# Patient Record
Sex: FEMALE | NOT HISPANIC OR LATINO | URBAN - METROPOLITAN AREA
[De-identification: names, ages, dates, MRNs, and addresses within clinical notes are randomized per-mention and may not be internally consistent; named-entity substitution may affect disease eponyms.]

---

## 2018-02-13 ENCOUNTER — INPATIENT (INPATIENT)
Facility: HOSPITAL | Age: 78
LOS: 5 days | Discharge: HOME HEALTH | End: 2018-02-19
Attending: SURGERY | Admitting: SURGERY
Payer: MEDICARE

## 2018-02-13 VITALS
TEMPERATURE: 98 F | OXYGEN SATURATION: 96 % | SYSTOLIC BLOOD PRESSURE: 139 MMHG | RESPIRATION RATE: 18 BRPM | HEART RATE: 118 BPM | DIASTOLIC BLOOD PRESSURE: 92 MMHG

## 2018-02-13 DIAGNOSIS — I10 ESSENTIAL (PRIMARY) HYPERTENSION: ICD-10-CM

## 2018-02-13 DIAGNOSIS — F03.90 UNSPECIFIED DEMENTIA WITHOUT BEHAVIORAL DISTURBANCE: ICD-10-CM

## 2018-02-13 DIAGNOSIS — E78.5 HYPERLIPIDEMIA, UNSPECIFIED: ICD-10-CM

## 2018-02-13 DIAGNOSIS — K56.609 UNSPECIFIED INTESTINAL OBSTRUCTION, UNSPECIFIED AS TO PARTIAL VERSUS COMPLETE OBSTRUCTION: ICD-10-CM

## 2018-02-13 LAB
ALBUMIN SERPL ELPH-MCNC: 3.3 G/DL — SIGNIFICANT CHANGE UP (ref 3.3–5)
ALP SERPL-CCNC: 110 U/L — SIGNIFICANT CHANGE UP (ref 40–120)
ALT FLD-CCNC: 18 U/L — SIGNIFICANT CHANGE UP (ref 12–78)
ANION GAP SERPL CALC-SCNC: 8 MMOL/L — SIGNIFICANT CHANGE UP (ref 5–17)
APPEARANCE UR: CLEAR — SIGNIFICANT CHANGE UP
APTT BLD: 25.3 SEC — LOW (ref 27.5–37.4)
AST SERPL-CCNC: 23 U/L — SIGNIFICANT CHANGE UP (ref 15–37)
BACTERIA # UR AUTO: ABNORMAL
BASOPHILS # BLD AUTO: 0.01 K/UL — SIGNIFICANT CHANGE UP (ref 0–0.2)
BASOPHILS NFR BLD AUTO: 0.1 % — SIGNIFICANT CHANGE UP (ref 0–2)
BILIRUB SERPL-MCNC: 0.9 MG/DL — SIGNIFICANT CHANGE UP (ref 0.2–1.2)
BILIRUB UR-MCNC: NEGATIVE — SIGNIFICANT CHANGE UP
BLD GP AB SCN SERPL QL: SIGNIFICANT CHANGE UP
BUN SERPL-MCNC: 15 MG/DL — SIGNIFICANT CHANGE UP (ref 7–23)
CALCIUM SERPL-MCNC: 9.1 MG/DL — SIGNIFICANT CHANGE UP (ref 8.5–10.1)
CHLORIDE SERPL-SCNC: 103 MMOL/L — SIGNIFICANT CHANGE UP (ref 96–108)
CK MB CFR SERPL CALC: 1.3 NG/ML — SIGNIFICANT CHANGE UP (ref 0.5–3.6)
CO2 SERPL-SCNC: 30 MMOL/L — SIGNIFICANT CHANGE UP (ref 22–31)
COLOR SPEC: YELLOW — SIGNIFICANT CHANGE UP
CREAT SERPL-MCNC: 1.06 MG/DL — SIGNIFICANT CHANGE UP (ref 0.5–1.3)
DIFF PNL FLD: ABNORMAL
EOSINOPHIL # BLD AUTO: 0 K/UL — SIGNIFICANT CHANGE UP (ref 0–0.5)
EOSINOPHIL NFR BLD AUTO: 0 % — SIGNIFICANT CHANGE UP (ref 0–6)
EPI CELLS # UR: ABNORMAL
GLUCOSE SERPL-MCNC: 153 MG/DL — HIGH (ref 70–99)
GLUCOSE UR QL: NEGATIVE MG/DL — SIGNIFICANT CHANGE UP
HCT VFR BLD CALC: 47.1 % — HIGH (ref 34.5–45)
HGB BLD-MCNC: 16.1 G/DL — HIGH (ref 11.5–15.5)
IMM GRANULOCYTES NFR BLD AUTO: 0.3 % — SIGNIFICANT CHANGE UP (ref 0–1.5)
INR BLD: 1.06 RATIO — SIGNIFICANT CHANGE UP (ref 0.88–1.16)
KETONES UR-MCNC: NEGATIVE — SIGNIFICANT CHANGE UP
LEUKOCYTE ESTERASE UR-ACNC: ABNORMAL
LIDOCAIN IGE QN: 167 U/L — SIGNIFICANT CHANGE UP (ref 73–393)
LYMPHOCYTES # BLD AUTO: 0.55 K/UL — LOW (ref 1–3.3)
LYMPHOCYTES # BLD AUTO: 7.2 % — LOW (ref 13–44)
MAGNESIUM SERPL-MCNC: 2.2 MG/DL — SIGNIFICANT CHANGE UP (ref 1.6–2.6)
MCHC RBC-ENTMCNC: 32.8 PG — SIGNIFICANT CHANGE UP (ref 27–34)
MCHC RBC-ENTMCNC: 34.2 GM/DL — SIGNIFICANT CHANGE UP (ref 32–36)
MCV RBC AUTO: 95.9 FL — SIGNIFICANT CHANGE UP (ref 80–100)
MONOCYTES # BLD AUTO: 0.42 K/UL — SIGNIFICANT CHANGE UP (ref 0–0.9)
MONOCYTES NFR BLD AUTO: 5.5 % — SIGNIFICANT CHANGE UP (ref 2–14)
NEUTROPHILS # BLD AUTO: 6.68 K/UL — SIGNIFICANT CHANGE UP (ref 1.8–7.4)
NEUTROPHILS NFR BLD AUTO: 86.9 % — HIGH (ref 43–77)
NITRITE UR-MCNC: NEGATIVE — SIGNIFICANT CHANGE UP
NRBC # BLD: 0 /100 WBCS — SIGNIFICANT CHANGE UP (ref 0–0)
PH UR: 5 — SIGNIFICANT CHANGE UP (ref 5–8)
PLATELET # BLD AUTO: 210 K/UL — SIGNIFICANT CHANGE UP (ref 150–400)
POTASSIUM SERPL-MCNC: 4 MMOL/L — SIGNIFICANT CHANGE UP (ref 3.5–5.3)
POTASSIUM SERPL-SCNC: 4 MMOL/L — SIGNIFICANT CHANGE UP (ref 3.5–5.3)
PROT SERPL-MCNC: 8.3 GM/DL — SIGNIFICANT CHANGE UP (ref 6–8.3)
PROT UR-MCNC: 30 MG/DL
PROTHROM AB SERPL-ACNC: 11.6 SEC — SIGNIFICANT CHANGE UP (ref 9.8–12.7)
RBC # BLD: 4.91 M/UL — SIGNIFICANT CHANGE UP (ref 3.8–5.2)
RBC # FLD: 13 % — SIGNIFICANT CHANGE UP (ref 10.3–14.5)
RBC CASTS # UR COMP ASSIST: SIGNIFICANT CHANGE UP /HPF (ref 0–4)
SODIUM SERPL-SCNC: 141 MMOL/L — SIGNIFICANT CHANGE UP (ref 135–145)
SP GR SPEC: 1.02 — SIGNIFICANT CHANGE UP (ref 1.01–1.02)
TROPONIN I SERPL-MCNC: <.015 NG/ML — SIGNIFICANT CHANGE UP (ref 0.01–0.04)
UROBILINOGEN FLD QL: NEGATIVE MG/DL — SIGNIFICANT CHANGE UP
WBC # BLD: 7.68 K/UL — SIGNIFICANT CHANGE UP (ref 3.8–10.5)
WBC # FLD AUTO: 7.68 K/UL — SIGNIFICANT CHANGE UP (ref 3.8–10.5)
WBC UR QL: ABNORMAL

## 2018-02-13 PROCEDURE — 99223 1ST HOSP IP/OBS HIGH 75: CPT | Mod: 25,57

## 2018-02-13 PROCEDURE — 93010 ELECTROCARDIOGRAM REPORT: CPT

## 2018-02-13 PROCEDURE — 99291 CRITICAL CARE FIRST HOUR: CPT

## 2018-02-13 PROCEDURE — 99285 EMERGENCY DEPT VISIT HI MDM: CPT

## 2018-02-13 PROCEDURE — 44005 FREEING OF BOWEL ADHESION: CPT

## 2018-02-13 PROCEDURE — 74177 CT ABD & PELVIS W/CONTRAST: CPT | Mod: 26

## 2018-02-13 RX ORDER — CEFTRIAXONE 500 MG/1
1 INJECTION, POWDER, FOR SOLUTION INTRAMUSCULAR; INTRAVENOUS ONCE
Qty: 0 | Refills: 0 | Status: COMPLETED | OUTPATIENT
Start: 2018-02-13 | End: 2018-02-13

## 2018-02-13 RX ORDER — AMLODIPINE BESYLATE 2.5 MG/1
2.5 TABLET ORAL DAILY
Qty: 0 | Refills: 0 | Status: DISCONTINUED | OUTPATIENT
Start: 2018-02-13 | End: 2018-02-13

## 2018-02-13 RX ORDER — SODIUM CHLORIDE 9 MG/ML
1000 INJECTION, SOLUTION INTRAVENOUS
Qty: 0 | Refills: 0 | Status: DISCONTINUED | OUTPATIENT
Start: 2018-02-13 | End: 2018-02-14

## 2018-02-13 RX ORDER — SIMVASTATIN 20 MG/1
0 TABLET, FILM COATED ORAL
Qty: 0 | Refills: 0 | COMMUNITY

## 2018-02-13 RX ORDER — ACETAMINOPHEN 500 MG
650 TABLET ORAL EVERY 6 HOURS
Qty: 0 | Refills: 0 | Status: DISCONTINUED | OUTPATIENT
Start: 2018-02-13 | End: 2018-02-13

## 2018-02-13 RX ORDER — AMLODIPINE BESYLATE 2.5 MG/1
0 TABLET ORAL
Qty: 0 | Refills: 0 | COMMUNITY

## 2018-02-13 RX ORDER — ONDANSETRON 8 MG/1
4 TABLET, FILM COATED ORAL EVERY 6 HOURS
Qty: 0 | Refills: 0 | Status: DISCONTINUED | OUTPATIENT
Start: 2018-02-13 | End: 2018-02-15

## 2018-02-13 RX ORDER — MORPHINE SULFATE 50 MG/1
2 CAPSULE, EXTENDED RELEASE ORAL EVERY 6 HOURS
Qty: 0 | Refills: 0 | Status: DISCONTINUED | OUTPATIENT
Start: 2018-02-13 | End: 2018-02-14

## 2018-02-13 RX ORDER — MORPHINE SULFATE 50 MG/1
2 CAPSULE, EXTENDED RELEASE ORAL
Qty: 0 | Refills: 0 | Status: DISCONTINUED | OUTPATIENT
Start: 2018-02-13 | End: 2018-02-14

## 2018-02-13 RX ORDER — ONDANSETRON 8 MG/1
4 TABLET, FILM COATED ORAL ONCE
Qty: 0 | Refills: 0 | Status: DISCONTINUED | OUTPATIENT
Start: 2018-02-13 | End: 2018-02-14

## 2018-02-13 RX ORDER — SODIUM CHLORIDE 9 MG/ML
1000 INJECTION INTRAMUSCULAR; INTRAVENOUS; SUBCUTANEOUS
Qty: 0 | Refills: 0 | Status: DISCONTINUED | OUTPATIENT
Start: 2018-02-13 | End: 2018-02-13

## 2018-02-13 RX ORDER — HEPARIN SODIUM 5000 [USP'U]/ML
5000 INJECTION INTRAVENOUS; SUBCUTANEOUS EVERY 12 HOURS
Qty: 0 | Refills: 0 | Status: DISCONTINUED | OUTPATIENT
Start: 2018-02-13 | End: 2018-02-19

## 2018-02-13 RX ORDER — ONDANSETRON 8 MG/1
4 TABLET, FILM COATED ORAL EVERY 6 HOURS
Qty: 0 | Refills: 0 | Status: DISCONTINUED | OUTPATIENT
Start: 2018-02-13 | End: 2018-02-13

## 2018-02-13 RX ORDER — CEFOTETAN DISODIUM 1 G
2 VIAL (EA) INJECTION EVERY 12 HOURS
Qty: 0 | Refills: 0 | Status: DISCONTINUED | OUTPATIENT
Start: 2018-02-13 | End: 2018-02-14

## 2018-02-13 RX ADMIN — CEFTRIAXONE 100 GRAM(S): 500 INJECTION, POWDER, FOR SOLUTION INTRAMUSCULAR; INTRAVENOUS at 17:59

## 2018-02-13 NOTE — H&P ADULT - ATTENDING COMMENTS
Patient seen and examined.  CT scan report and images reviewed and consistent with closed loop SBO.    OR for emergent exploratory laparotomy, relief of closed loop small bowel resection, possible bowel resection, possible ostomy.    I explained to the patient's daughter, who her is health care proxy, that her mother will require an emergent operative exploration given the CT findings of a closed loop small bowel obstruction.  The risks of surgery, including but not limited to, bleeding, infection, damage to surrounding structures (including enterotomy), need for bowel resection and/or ostomy, and possibility of recurrent obstructions, were discussed with the patient's daughter who understands these risks and consents to the planned surgery.  In addition, the risks associated with small bowel resection, including anastomotic leak, were discussed.  All questions were answered. Patient seen and examined.  CT scan report and images reviewed and consistent with a closed loop SBO.    OR for emergent exploratory laparotomy, relief of closed loop small bowel resection, possible bowel resection, possible ostomy.    I explained to the patient's daughter, who her is health care proxy, that her mother will require an emergent operative exploration given the CT findings of a closed loop small bowel obstruction.  The risks of surgery, including but not limited to, bleeding, infection, damage to surrounding structures (including enterotomy), need for bowel resection and/or ostomy, and possibility of recurrent obstructions, were discussed with the patient's daughter who understands these risks and consents to the planned surgery.  In addition, the risks associated with small bowel resection, including anastomotic leak, were discussed.  All questions were answered.

## 2018-02-13 NOTE — H&P ADULT - HISTORY OF PRESENT ILLNESS
78 y/o female PMHx HTN, HLD, dementia 78 y/o female PMHx HTN, HLD, dementia c/p abdominal pain. Patient's daughter states that the aid noticed the patient holding onto her abdominal in pain today. No nausea/vomiting. Patient's last BM is unknown. +Flatus in the ED per daughter. No other complaints. Denies fever, chills, nausea, vomiting, constipation, diarrhea, hematuria, melena, hematochezia, chest pain, shortness of breath, dizziness. Patient denies prior incident. Denies surgical hx. 78 y/o female PMHx HTN, HLD, dementia c/o abdominal pain. Patient's daughter states that the aid noticed the patient holding onto her abdominal in pain today. No nausea/vomiting. Patient's last BM is unknown. +Flatus in the ED per daughter. No other complaints. Denies fever, chills, nausea, vomiting, constipation, diarrhea, hematuria, melena, hematochezia, chest pain, shortness of breath, dizziness. Patient denies prior incident. Denies surgical hx.

## 2018-02-13 NOTE — ED PROVIDER NOTE - PHYSICAL EXAMINATION
Gen: Alert, NAD  Head: NC, AT   Eyes: PERRL, EOMI, normal lids/conjunctiva  ENT: normal hearing, patent oropharynx without erythema/exudate, uvula midline  Neck: supple, no tenderness, Trachea midline  Pulm: Bilateral BS, normal resp effort, no wheeze/stridor/retractions  CV: RRR, no M/R/G, 2+ radial and dp pulses bl, no edema  Abd: soft, ttp on the left abd. +BS, no hepatosplenomegaly  Mskel: extremities x4 with normal ROM and no joint effusions. no ctl spine ttp.   Skin: no rash, no bruising   Neuro: AAOx1, no sensory/motor deficits, CN 2-12 intact

## 2018-02-13 NOTE — BRIEF OPERATIVE NOTE - PROCEDURE
<<-----Click on this checkbox to enter Procedure Exploratory laparotomy  02/13/2018    Active  NMARINO1  Lysis of adhesions  02/13/2018  to release closed loop obstruction  Active  NMARINO1

## 2018-02-13 NOTE — H&P ADULT - PROBLEM SELECTOR PLAN 1
Admit patient  OR for ex lap, possible bowel resection, possible ostomy  NGT, NPO, IVF  pain meds, anti-emetic prn  dvt ppx  Consent signed by daughter  Discussed with Dr Rascon. Seen and examined with Abiodun

## 2018-02-13 NOTE — H&P ADULT - NSHPLABSRESULTS_GEN_ALL_CORE
16.1   7.68  )-----------( 210      ( 13 Feb 2018 15:21 )             47.1   02-13    141  |  103  |  15  ----------------------------<  153<H>  4.0   |  30  |  1.06    Ca    9.1      13 Feb 2018 15:21  Mg     2.2     02-13    TPro  8.3  /  Alb  3.3  /  TBili  0.9  /  DBili  x   /  AST  23  /  ALT  18  /  AlkPhos  110  02-13      CT Abdomen and Pelvis w/ IV Cont (02.13.18 @ 18:11)   IMPRESSION:   Small bowel obstruction with mid lower abdominal transition point. Areas   of small bowel wall thickening and mesenteric edema are noted.

## 2018-02-13 NOTE — ED PROVIDER NOTE - OBJECTIVE STATEMENT
Pertinent PMH/PSH/FHx/SHx and Review of Systems contained within:  77F hx of dementia htn, no prev surgeries pw abd pain. patient was complaining of abd pain while at home with aide. no reprot was given of nausea, vomiting, fever or chills. patients child was called and meets patient in the ER. patient complains of NO abd pain, nausea, vomiting, dysuria, cp or sob now. however, she is demented  Fh and Sh not otherwise contributory  ROS otherwise negative

## 2018-02-13 NOTE — ED ADULT TRIAGE NOTE - CHIEF COMPLAINT QUOTE
BIBEMS from home.  c/o abdominal pain, denies N/V/D.  Denies urinary complaints.   Pt received AAOx2 (confused to year)  Hx:  dementia

## 2018-02-13 NOTE — ED ADULT NURSE NOTE - OBJECTIVE STATEMENT
patient received, BIBA with daughter at bedside, daughter stated aide called ambulance because patient was complaining of abd pain. denies abd pain, denies n/v/d, denies dizziness. no distress noted

## 2018-02-13 NOTE — CONSULT NOTE ADULT - ATTENDING COMMENTS
s/p Exploratory laparotomy, Lysis of adhesions to release closed loop obstruction (SBO). Pt is s/p successful extubation and hemodynamically stable. Currently does not require ICU monitoring

## 2018-02-13 NOTE — ED PROVIDER NOTE - MEDICAL DECISION MAKING DETAILS
Patient pw abd pain. given dementia, will need to work up. CT shows sbo. Ua shows uti. Labs wnl. Will keep npo and admit to gen surg. Dr. silver aware.

## 2018-02-13 NOTE — CONSULT NOTE ADULT - SUBJECTIVE AND OBJECTIVE BOX
Patient is a 76 y/o female PMHx HTN, HLD, dementia  presented today to ED for abdominal pain. CT abdomen showed: < from: CT Abdomen and Pelvis w/ IV Cont (18 @ 18:11) >  there are at least 2 transitions noted in the mid lower abdomen, notably on image 35:601, concerning for possible closed loop obstruction.   Small bowel obstruction with mid lower abdominal transition point. Areas   of small bowel wall thickening and mesenteric edema are noted.  Patient went to OR underwent Ex Lap for SBO, patient extubated in OR. As per anesthesia patient BP dropped and received 2L of boluses and had minimal blood loss.  ICU consulted, patient seen and evaluated with icu fellow.         PAST MEDICAL & SURGICAL HISTORY:  Dementia  Hyperlipidemia  HTN (hypertension)  No significant past surgical history    FAMILY HISTORY:  No pertinent family history in first degree relatives    Social History: Allergies    No Known Allergies    Intolerances        MEDICATIONS  (STANDING):  cefoTEtan  IVPB 2 Gram(s) IV Intermittent every 12 hours  enalaprilat Injectable 1.25 milliGRAM(s) IV Push every 6 hours  heparin  Injectable 5000 Unit(s) SubCutaneous every 12 hours  lactated ringers. 1000 milliLiter(s) (100 mL/Hr) IV Continuous <Continuous>    MEDICATIONS  (PRN):  morphine  - Injectable 2 milliGRAM(s) IV Push every 10 minutes PRN Severe Pain  morphine  - Injectable 2 milliGRAM(s) IV Push every 6 hours PRN Severe Pain (7 - 10)  ondansetron Injectable 4 milliGRAM(s) IV Push once PRN Nausea and/or Vomiting  ondansetron Injectable 4 milliGRAM(s) IV Push every 6 hours PRN Nausea and/or Vomiting      REVIEW OF SYSTEMS:  patient post-op opens eyes to voice,       PHYSICAL EXAM:    T(C): 36.6 (2018 22:25), Max: 37.2 (2018 20:14)  T(F): 97.8 (2018 22:25), Max: 99 (2018 20:14)  HR: 73 (2018 23:10) (73 - 118)  BP: 161/89 (2018 23:10) (139/92 - 185/99)  RR: 19 (2018 23:10) (18 - 23)  SpO2: 100% (2018 23:10) (96% - 100%)    GENERAL: sleepy, post-op from anesthesia  NERVOUS SYSTEM:  sleepy, but opens eyes to voice  CHEST/LUNG: b/l rhonchi  HEART: Regular rate and rhythm; No murmurs, rubs, or gallops  ABDOMEN: midline incision dressing clean and dry  EXTREMITIES:  2+ Peripheral Pulses, No clubbing, cyanosis, or edema          LABS:                        16.1   7.68  )-----------( 210      ( 2018 15:21 )             47.1         141  |  103  |  15  ----------------------------<  153<H>  4.0   |  30  |  1.06    Ca    9.1      2018 15:21  Mg     2.2         TPro  8.3  /  Alb  3.3  /  TBili  0.9  /  DBili  x   /  AST  23  /  ALT  18  /  AlkPhos  110  13    PT/INR - ( 2018 15:21 )   PT: 11.6 sec;   INR: 1.06 ratio         PTT - ( 2018 15:21 )  PTT:25.3 sec  Urinalysis Basic - ( 2018 17:18 )    Color: Yellow / Appearance: Clear / S.020 / pH: x  Gluc: x / Ketone: Negative  / Bili: Negative / Urobili: Negative mg/dL   Blood: x / Protein: 30 mg/dL / Nitrite: Negative   Leuk Esterase: Trace / RBC: 0-2 /HPF / WBC 6-10   Sq Epi: x / Non Sq Epi: Many / Bacteria: Moderate        RADIOLOGY & ADDITIONAL STUDIES:  < from: CT Abdomen and Pelvis w/ IV Cont (18 @ 18:11) >  Small bowel obstruction with mid lower abdominal transition point. Areas   of small bowel wall thickening and mesenteric edema are noted.  here are at least 2 transitions noted in the mid   lower abdomen, notably on image 35:601, concerning for possible closed   loop obstruction.      A/P    Patient is a 76 y/o female PMHx HTN, HLD, dementia , s/p Ex Lap with SEPIDEH for SBO  Patient seen and evaluated with ICU fellow  Patient is sleepy but arousable to voice, due to anestheisa.  Patient is saturating well on room air, hemodynamic stable SBP was 160 with good  urine output  would follow up urine output >5ml/kg/hr  and repeat CBC post-op  At this point patient does not need icu care, if patient's BP drops or condition changes please re-consult.    Case d/w with EICU attending.

## 2018-02-14 ENCOUNTER — TRANSCRIPTION ENCOUNTER (OUTPATIENT)
Age: 78
End: 2018-02-14

## 2018-02-14 LAB
ALBUMIN SERPL ELPH-MCNC: 2.7 G/DL — LOW (ref 3.3–5)
ALBUMIN SERPL ELPH-MCNC: 2.9 G/DL — LOW (ref 3.3–5)
ALP SERPL-CCNC: 79 U/L — SIGNIFICANT CHANGE UP (ref 40–120)
ALP SERPL-CCNC: 85 U/L — SIGNIFICANT CHANGE UP (ref 40–120)
ALT FLD-CCNC: 14 U/L — SIGNIFICANT CHANGE UP (ref 12–78)
ALT FLD-CCNC: 17 U/L — SIGNIFICANT CHANGE UP (ref 12–78)
ANION GAP SERPL CALC-SCNC: 8 MMOL/L — SIGNIFICANT CHANGE UP (ref 5–17)
ANION GAP SERPL CALC-SCNC: 8 MMOL/L — SIGNIFICANT CHANGE UP (ref 5–17)
AST SERPL-CCNC: 20 U/L — SIGNIFICANT CHANGE UP (ref 15–37)
AST SERPL-CCNC: 25 U/L — SIGNIFICANT CHANGE UP (ref 15–37)
BASOPHILS # BLD AUTO: 0.03 K/UL — SIGNIFICANT CHANGE UP (ref 0–0.2)
BASOPHILS # BLD AUTO: 0.03 K/UL — SIGNIFICANT CHANGE UP (ref 0–0.2)
BASOPHILS NFR BLD AUTO: 0.3 % — SIGNIFICANT CHANGE UP (ref 0–2)
BASOPHILS NFR BLD AUTO: 0.3 % — SIGNIFICANT CHANGE UP (ref 0–2)
BILIRUB SERPL-MCNC: 0.8 MG/DL — SIGNIFICANT CHANGE UP (ref 0.2–1.2)
BILIRUB SERPL-MCNC: 1.2 MG/DL — SIGNIFICANT CHANGE UP (ref 0.2–1.2)
BUN SERPL-MCNC: 13 MG/DL — SIGNIFICANT CHANGE UP (ref 7–23)
BUN SERPL-MCNC: 13 MG/DL — SIGNIFICANT CHANGE UP (ref 7–23)
CALCIUM SERPL-MCNC: 8.2 MG/DL — LOW (ref 8.5–10.1)
CALCIUM SERPL-MCNC: 8.6 MG/DL — SIGNIFICANT CHANGE UP (ref 8.5–10.1)
CHLORIDE SERPL-SCNC: 106 MMOL/L — SIGNIFICANT CHANGE UP (ref 96–108)
CHLORIDE SERPL-SCNC: 109 MMOL/L — HIGH (ref 96–108)
CO2 SERPL-SCNC: 27 MMOL/L — SIGNIFICANT CHANGE UP (ref 22–31)
CO2 SERPL-SCNC: 28 MMOL/L — SIGNIFICANT CHANGE UP (ref 22–31)
CREAT SERPL-MCNC: 1.07 MG/DL — SIGNIFICANT CHANGE UP (ref 0.5–1.3)
CREAT SERPL-MCNC: 1.09 MG/DL — SIGNIFICANT CHANGE UP (ref 0.5–1.3)
CULTURE RESULTS: SIGNIFICANT CHANGE UP
EOSINOPHIL # BLD AUTO: 0 K/UL — SIGNIFICANT CHANGE UP (ref 0–0.5)
EOSINOPHIL # BLD AUTO: 0 K/UL — SIGNIFICANT CHANGE UP (ref 0–0.5)
EOSINOPHIL NFR BLD AUTO: 0 % — SIGNIFICANT CHANGE UP (ref 0–6)
EOSINOPHIL NFR BLD AUTO: 0 % — SIGNIFICANT CHANGE UP (ref 0–6)
GLUCOSE SERPL-MCNC: 130 MG/DL — HIGH (ref 70–99)
GLUCOSE SERPL-MCNC: 131 MG/DL — HIGH (ref 70–99)
HCT VFR BLD CALC: 42.7 % — SIGNIFICANT CHANGE UP (ref 34.5–45)
HCT VFR BLD CALC: 45.3 % — HIGH (ref 34.5–45)
HGB BLD-MCNC: 14.3 G/DL — SIGNIFICANT CHANGE UP (ref 11.5–15.5)
HGB BLD-MCNC: 15.1 G/DL — SIGNIFICANT CHANGE UP (ref 11.5–15.5)
IMM GRANULOCYTES NFR BLD AUTO: 0.2 % — SIGNIFICANT CHANGE UP (ref 0–1.5)
IMM GRANULOCYTES NFR BLD AUTO: 0.2 % — SIGNIFICANT CHANGE UP (ref 0–1.5)
LACTATE SERPL-SCNC: 2 MMOL/L — SIGNIFICANT CHANGE UP (ref 0.7–2)
LACTATE SERPL-SCNC: 2.7 MMOL/L — HIGH (ref 0.7–2)
LACTATE SERPL-SCNC: 3.8 MMOL/L — HIGH (ref 0.7–2)
LYMPHOCYTES # BLD AUTO: 0.63 K/UL — LOW (ref 1–3.3)
LYMPHOCYTES # BLD AUTO: 0.86 K/UL — LOW (ref 1–3.3)
LYMPHOCYTES # BLD AUTO: 6.4 % — LOW (ref 13–44)
LYMPHOCYTES # BLD AUTO: 8.5 % — LOW (ref 13–44)
MAGNESIUM SERPL-MCNC: 2 MG/DL — SIGNIFICANT CHANGE UP (ref 1.6–2.6)
MCHC RBC-ENTMCNC: 32.6 PG — SIGNIFICANT CHANGE UP (ref 27–34)
MCHC RBC-ENTMCNC: 33.1 PG — SIGNIFICANT CHANGE UP (ref 27–34)
MCHC RBC-ENTMCNC: 33.3 GM/DL — SIGNIFICANT CHANGE UP (ref 32–36)
MCHC RBC-ENTMCNC: 33.5 GM/DL — SIGNIFICANT CHANGE UP (ref 32–36)
MCV RBC AUTO: 97.8 FL — SIGNIFICANT CHANGE UP (ref 80–100)
MCV RBC AUTO: 98.8 FL — SIGNIFICANT CHANGE UP (ref 80–100)
MONOCYTES # BLD AUTO: 0.68 K/UL — SIGNIFICANT CHANGE UP (ref 0–0.9)
MONOCYTES # BLD AUTO: 0.83 K/UL — SIGNIFICANT CHANGE UP (ref 0–0.9)
MONOCYTES NFR BLD AUTO: 6.7 % — SIGNIFICANT CHANGE UP (ref 2–14)
MONOCYTES NFR BLD AUTO: 8.5 % — SIGNIFICANT CHANGE UP (ref 2–14)
NEUTROPHILS # BLD AUTO: 8.26 K/UL — HIGH (ref 1.8–7.4)
NEUTROPHILS # BLD AUTO: 8.49 K/UL — HIGH (ref 1.8–7.4)
NEUTROPHILS NFR BLD AUTO: 84.3 % — HIGH (ref 43–77)
NEUTROPHILS NFR BLD AUTO: 84.6 % — HIGH (ref 43–77)
NRBC # BLD: 0 /100 WBCS — SIGNIFICANT CHANGE UP (ref 0–0)
PHOSPHATE SERPL-MCNC: 3.8 MG/DL — SIGNIFICANT CHANGE UP (ref 2.5–4.5)
PLATELET # BLD AUTO: 171 K/UL — SIGNIFICANT CHANGE UP (ref 150–400)
PLATELET # BLD AUTO: 177 K/UL — SIGNIFICANT CHANGE UP (ref 150–400)
POTASSIUM SERPL-MCNC: 3.7 MMOL/L — SIGNIFICANT CHANGE UP (ref 3.5–5.3)
POTASSIUM SERPL-MCNC: 3.7 MMOL/L — SIGNIFICANT CHANGE UP (ref 3.5–5.3)
POTASSIUM SERPL-SCNC: 3.7 MMOL/L — SIGNIFICANT CHANGE UP (ref 3.5–5.3)
POTASSIUM SERPL-SCNC: 3.7 MMOL/L — SIGNIFICANT CHANGE UP (ref 3.5–5.3)
PROT SERPL-MCNC: 6.9 GM/DL — SIGNIFICANT CHANGE UP (ref 6–8.3)
PROT SERPL-MCNC: 7.1 GM/DL — SIGNIFICANT CHANGE UP (ref 6–8.3)
RBC # BLD: 4.32 M/UL — SIGNIFICANT CHANGE UP (ref 3.8–5.2)
RBC # BLD: 4.63 M/UL — SIGNIFICANT CHANGE UP (ref 3.8–5.2)
RBC # FLD: 13.3 % — SIGNIFICANT CHANGE UP (ref 10.3–14.5)
RBC # FLD: 13.5 % — SIGNIFICANT CHANGE UP (ref 10.3–14.5)
SODIUM SERPL-SCNC: 141 MMOL/L — SIGNIFICANT CHANGE UP (ref 135–145)
SODIUM SERPL-SCNC: 145 MMOL/L — SIGNIFICANT CHANGE UP (ref 135–145)
SPECIMEN SOURCE: SIGNIFICANT CHANGE UP
WBC # BLD: 10.08 K/UL — SIGNIFICANT CHANGE UP (ref 3.8–10.5)
WBC # BLD: 9.77 K/UL — SIGNIFICANT CHANGE UP (ref 3.8–10.5)
WBC # FLD AUTO: 10.08 K/UL — SIGNIFICANT CHANGE UP (ref 3.8–10.5)
WBC # FLD AUTO: 9.77 K/UL — SIGNIFICANT CHANGE UP (ref 3.8–10.5)

## 2018-02-14 RX ORDER — MORPHINE SULFATE 50 MG/1
2 CAPSULE, EXTENDED RELEASE ORAL EVERY 4 HOURS
Qty: 0 | Refills: 0 | Status: DISCONTINUED | OUTPATIENT
Start: 2018-02-14 | End: 2018-02-17

## 2018-02-14 RX ORDER — INFLUENZA VIRUS VACCINE 15; 15; 15; 15 UG/.5ML; UG/.5ML; UG/.5ML; UG/.5ML
0.5 SUSPENSION INTRAMUSCULAR ONCE
Qty: 0 | Refills: 0 | Status: COMPLETED | OUTPATIENT
Start: 2018-02-14 | End: 2018-02-14

## 2018-02-14 RX ORDER — CEFOTETAN DISODIUM 1 G
2 VIAL (EA) INJECTION EVERY 12 HOURS
Qty: 0 | Refills: 0 | Status: DISCONTINUED | OUTPATIENT
Start: 2018-02-14 | End: 2018-02-18

## 2018-02-14 RX ORDER — SODIUM CHLORIDE 9 MG/ML
1000 INJECTION INTRAMUSCULAR; INTRAVENOUS; SUBCUTANEOUS ONCE
Qty: 0 | Refills: 0 | Status: COMPLETED | OUTPATIENT
Start: 2018-02-14 | End: 2018-02-14

## 2018-02-14 RX ORDER — SODIUM CHLORIDE 9 MG/ML
1000 INJECTION INTRAMUSCULAR; INTRAVENOUS; SUBCUTANEOUS
Qty: 0 | Refills: 0 | Status: DISCONTINUED | OUTPATIENT
Start: 2018-02-14 | End: 2018-02-15

## 2018-02-14 RX ORDER — SODIUM CHLORIDE 9 MG/ML
500 INJECTION INTRAMUSCULAR; INTRAVENOUS; SUBCUTANEOUS ONCE
Qty: 0 | Refills: 0 | Status: DISCONTINUED | OUTPATIENT
Start: 2018-02-14 | End: 2018-02-14

## 2018-02-14 RX ADMIN — Medication 100 GRAM(S): at 08:29

## 2018-02-14 RX ADMIN — SODIUM CHLORIDE 125 MILLILITER(S): 9 INJECTION INTRAMUSCULAR; INTRAVENOUS; SUBCUTANEOUS at 15:35

## 2018-02-14 RX ADMIN — MORPHINE SULFATE 2 MILLIGRAM(S): 50 CAPSULE, EXTENDED RELEASE ORAL at 05:54

## 2018-02-14 RX ADMIN — Medication 1.25 MILLIGRAM(S): at 17:38

## 2018-02-14 RX ADMIN — MORPHINE SULFATE 2 MILLIGRAM(S): 50 CAPSULE, EXTENDED RELEASE ORAL at 18:06

## 2018-02-14 RX ADMIN — Medication 1.25 MILLIGRAM(S): at 08:28

## 2018-02-14 RX ADMIN — SODIUM CHLORIDE 125 MILLILITER(S): 9 INJECTION INTRAMUSCULAR; INTRAVENOUS; SUBCUTANEOUS at 17:39

## 2018-02-14 RX ADMIN — MORPHINE SULFATE 2 MILLIGRAM(S): 50 CAPSULE, EXTENDED RELEASE ORAL at 17:45

## 2018-02-14 RX ADMIN — Medication 1.25 MILLIGRAM(S): at 01:14

## 2018-02-14 RX ADMIN — MORPHINE SULFATE 2 MILLIGRAM(S): 50 CAPSULE, EXTENDED RELEASE ORAL at 06:10

## 2018-02-14 RX ADMIN — MORPHINE SULFATE 2 MILLIGRAM(S): 50 CAPSULE, EXTENDED RELEASE ORAL at 22:13

## 2018-02-14 RX ADMIN — MORPHINE SULFATE 2 MILLIGRAM(S): 50 CAPSULE, EXTENDED RELEASE ORAL at 21:56

## 2018-02-14 RX ADMIN — HEPARIN SODIUM 5000 UNIT(S): 5000 INJECTION INTRAVENOUS; SUBCUTANEOUS at 05:55

## 2018-02-14 RX ADMIN — SODIUM CHLORIDE 1000 MILLILITER(S): 9 INJECTION INTRAMUSCULAR; INTRAVENOUS; SUBCUTANEOUS at 09:10

## 2018-02-14 RX ADMIN — Medication 1.25 MILLIGRAM(S): at 12:14

## 2018-02-14 RX ADMIN — Medication 100 GRAM(S): at 20:54

## 2018-02-14 RX ADMIN — HEPARIN SODIUM 5000 UNIT(S): 5000 INJECTION INTRAVENOUS; SUBCUTANEOUS at 17:38

## 2018-02-14 RX ADMIN — SODIUM CHLORIDE 100 MILLILITER(S): 9 INJECTION INTRAMUSCULAR; INTRAVENOUS; SUBCUTANEOUS at 06:34

## 2018-02-14 NOTE — PHYSICAL THERAPY INITIAL EVALUATION ADULT - LIVES WITH, PROFILE
Lives with family members in a private house, details to be determined, pt. is confused and not giving information

## 2018-02-14 NOTE — PROGRESS NOTE ADULT - SUBJECTIVE AND OBJECTIVE BOX
SURGERY PROGRESS HPI:  S/P ex lap, SEPIDEH POD#1  77 year old Female seen and examined at bedside. Denies pain and complaints. Tolerating NGT well. Tolerating haro well. Denies chest pain, shortness of breath, nausea/ vomiting, and dizziness      Vital Signs Last 24 Hrs  T(C): 36.6 (2018 03:00), Max: 37.2 (2018 20:14)  T(F): 97.8 (2018 03:00), Max: 99 (2018 20:14)  HR: 73 (2018 03:00) (73 - 118)  BP: 154/83 (2018 03:00) (139/92 - 185/99)  BP(mean): --  RR: 16 (2018 03:00) (16 - 23)  SpO2: 100% (2018 03:00) (96% - 100%)      PHYSICAL EXAM:    GENERAL: Alert, NAD  HEAD: NGT with light tan output  CHEST/LUNG: Clear to auscultation bilaterally, respirations nonlabored  HEART: S1S2, Regular rate and rhythm  ABDOMEN: Dressing C/D/I; -Bowel sounds, soft, Appropriate incisional tenderness, Nondistended  : haro indwelling with florescent yellow urine due to fluorescein used in OR  EXTREMITIES: no calf tenderness, No edema, intermittent compression devices in place bilaterally    I&O's Detail    2018 07:01  -  2018 05:58  --------------------------------------------------------  IN:    lactated ringers.: 100 mL  Total IN: 100 mL    OUT:    Indwelling Catheter - Urethral: 400 mL    Nasoenteral Tube: 100 mL  Total OUT: 500 mL    Total NET: -400 mL          LABS:                        16.1   7.68  )-----------( 210      ( 2018 15:21 )             47.1     02-13    141  |  103  |  15  ----------------------------<  153<H>  4.0   |  30  |  1.06    Ca    9.1      2018 15:21  Mg     2.2     02-13    TPro  8.3  /  Alb  3.3  /  TBili  0.9  /  DBili  x   /  AST  23  /  ALT  18  /  AlkPhos  110  02-13    PT/INR - ( 2018 15:21 )   PT: 11.6 sec;   INR: 1.06 ratio         PTT - ( 2018 15:21 )  PTT:25.3 sec  Urinalysis Basic - ( 2018 17:18 )    Color: Yellow / Appearance: Clear / S.020 / pH: x  Gluc: x / Ketone: Negative  / Bili: Negative / Urobili: Negative mg/dL   Blood: x / Protein: 30 mg/dL / Nitrite: Negative   Leuk Esterase: Trace / RBC: 0-2 /HPF / WBC 6-10   Sq Epi: x / Non Sq Epi: Many / Bacteria: Moderate        Assessment: 77 year old female PMHx dementia, HLD, HTN s/p ex lap, SEPIDEH POD#1    Plan:  - NPO, NGT  - 1 to 1, restraints  - local wound care  - DVT prophylaxis, Incentive Spirometer, OOB, Ambulating, pain control  - f/u labs   - will discuss with surgical attending

## 2018-02-14 NOTE — CHART NOTE - NSCHARTNOTEFT_GEN_A_CORE
Discussed case with Dr. Rascon.  Will take patient off 1:1 but with continued close observation.  Lactate this am 3.8.  Will give bolus and trend lactate.  Serial abdominal exam.

## 2018-02-14 NOTE — PHYSICAL THERAPY INITIAL EVALUATION ADULT - IMPAIRMENTS FOUND, PT EVAL
poor safety awareness/aerobic capacity/endurance/arousal, attention, and cognition/gait, locomotion, and balance/muscle strength/posture/h/o dementia, difficulty following directions/ergonomics and body mechanics

## 2018-02-14 NOTE — PROGRESS NOTE ADULT - SUBJECTIVE AND OBJECTIVE BOX
Post-op check    S/P ex lap, SEPIDEH POD#1  77 year old Female seen and examined at bedside. Denies pain and complaints. Tolerating NGT well. Tolerating haro well. Denies chest pain, shortness of breath, nausea/ vomiting, and dizziness.     Vital Signs Last 24 Hrs  T(F): 97.9 (02-13-18 @ 23:25), Max: 99 (02-13-18 @ 20:14)  HR: 76 (02-13-18 @ 23:25)  BP: 170/90 (02-13-18 @ 23:25)  RR: 20 (02-13-18 @ 23:25)  SpO2: 100% (02-13-18 @ 23:25)  Wt(kg): --   CAPILLARY BLOOD GLUCOSE      GENERAL: Alert, NAD  CHEST/LUNG: Clear to auscultation bilaterally, respirations nonlabored  HEART: S1S2, Regular rate and rhythm  ABDOMEN: Dressing C/D/I; -Bowel sounds, soft, Appropriate incisional tenderness, Nondistended  EXTREMITIES: no calf tenderness, No edema, intermittent compression devices in place bilaterally      Assessment: 77 year old female PMHx dementia, HLD, HTN s/p ex lap, SEPIDEH POD#1    Plan:  - NPO, NGT  - 1 to 1, restraints  - local wound care  - DVT prophylaxis, Incentive Spirometer, OOB, Ambulating, pain control  - Continue antibiotics x 2 doses  - f/u labs   - will discuss with surgical attending Post-op check    S/P ex lap, SEPIDEH POD#1  77 year old Female seen and examined at bedside. Denies pain and complaints. Tolerating NGT well. Tolerating haro well. Denies chest pain, shortness of breath, nausea/ vomiting, and dizziness.     Vital Signs Last 24 Hrs  T(F): 97.9 (02-13-18 @ 23:25), Max: 99 (02-13-18 @ 20:14)  HR: 76 (02-13-18 @ 23:25)  BP: 170/90 (02-13-18 @ 23:25)  RR: 20 (02-13-18 @ 23:25)  SpO2: 100% (02-13-18 @ 23:25)  Wt(kg): --   CAPILLARY BLOOD GLUCOSE      GENERAL: Alert, NAD  CHEST/LUNG: Clear to auscultation bilaterally, respirations nonlabored  HEART: S1S2, Regular rate and rhythm  ABDOMEN: Dressing C/D/I; -Bowel sounds, soft, Appropriate incisional tenderness, Nondistended  : haro indwelling with florescent yellow urine due to fluorescein used in OR  EXTREMITIES: no calf tenderness, No edema, intermittent compression devices in place bilaterally      Assessment: 77 year old female PMHx dementia, HLD, HTN s/p ex lap, SEPIDEH POD#1    Plan:  - NPO, NGT  - 1 to 1, restraints  - local wound care  - DVT prophylaxis, Incentive Spirometer, OOB, Ambulating, pain control  - Continue antibiotics x 2 doses  - f/u labs   - will discuss with surgical attending Post-op check    S/P ex lap, SEPIDEH POD#1  77 year old Female seen and examined at bedside. Denies pain and complaints. Tolerating NGT well. Tolerating haro well. Denies chest pain, shortness of breath, nausea/ vomiting, and dizziness.     Vital Signs Last 24 Hrs  T(F): 97.9 (02-13-18 @ 23:25), Max: 99 (02-13-18 @ 20:14)  HR: 76 (02-13-18 @ 23:25)  BP: 170/90 (02-13-18 @ 23:25)  RR: 20 (02-13-18 @ 23:25)  SpO2: 100% (02-13-18 @ 23:25)  Wt(kg): --   CAPILLARY BLOOD GLUCOSE      GENERAL: Alert, NAD  HEAD: NGT with light tan output  CHEST/LUNG: Clear to auscultation bilaterally, respirations nonlabored  HEART: S1S2, Regular rate and rhythm  ABDOMEN: Dressing C/D/I; -Bowel sounds, soft, Appropriate incisional tenderness, Nondistended  : haro indwelling with florescent yellow urine due to fluorescein used in OR  EXTREMITIES: no calf tenderness, No edema, intermittent compression devices in place bilaterally      Assessment: 77 year old female PMHx dementia, HLD, HTN s/p ex lap, SEPIDEH POD#1    Plan:  - NPO, NGT  - 1 to 1, restraints  - local wound care  - DVT prophylaxis, Incentive Spirometer, OOB, Ambulating, pain control  - Continue antibiotics x 2 doses  - f/u labs   - will discuss with surgical attending

## 2018-02-14 NOTE — PHYSICAL THERAPY INITIAL EVALUATION ADULT - CRITERIA FOR SKILLED THERAPEUTIC INTERVENTIONS
anticipated discharge recommendation/impairments found/functional limitations in following categories/risk reduction/prevention

## 2018-02-15 LAB
ALBUMIN SERPL ELPH-MCNC: 2.4 G/DL — LOW (ref 3.3–5)
ALP SERPL-CCNC: 74 U/L — SIGNIFICANT CHANGE UP (ref 40–120)
ALT FLD-CCNC: 15 U/L — SIGNIFICANT CHANGE UP (ref 12–78)
ANION GAP SERPL CALC-SCNC: 8 MMOL/L — SIGNIFICANT CHANGE UP (ref 5–17)
AST SERPL-CCNC: 19 U/L — SIGNIFICANT CHANGE UP (ref 15–37)
BASOPHILS # BLD AUTO: 0.02 K/UL — SIGNIFICANT CHANGE UP (ref 0–0.2)
BASOPHILS NFR BLD AUTO: 0.2 % — SIGNIFICANT CHANGE UP (ref 0–2)
BILIRUB SERPL-MCNC: 0.9 MG/DL — SIGNIFICANT CHANGE UP (ref 0.2–1.2)
BUN SERPL-MCNC: 13 MG/DL — SIGNIFICANT CHANGE UP (ref 7–23)
CALCIUM SERPL-MCNC: 8.2 MG/DL — LOW (ref 8.5–10.1)
CHLORIDE SERPL-SCNC: 109 MMOL/L — HIGH (ref 96–108)
CO2 SERPL-SCNC: 29 MMOL/L — SIGNIFICANT CHANGE UP (ref 22–31)
CREAT SERPL-MCNC: 1.03 MG/DL — SIGNIFICANT CHANGE UP (ref 0.5–1.3)
EOSINOPHIL # BLD AUTO: 0 K/UL — SIGNIFICANT CHANGE UP (ref 0–0.5)
EOSINOPHIL NFR BLD AUTO: 0 % — SIGNIFICANT CHANGE UP (ref 0–6)
GLUCOSE SERPL-MCNC: 107 MG/DL — HIGH (ref 70–99)
HCT VFR BLD CALC: 38.8 % — SIGNIFICANT CHANGE UP (ref 34.5–45)
HGB BLD-MCNC: 13 G/DL — SIGNIFICANT CHANGE UP (ref 11.5–15.5)
IMM GRANULOCYTES NFR BLD AUTO: 0.3 % — SIGNIFICANT CHANGE UP (ref 0–1.5)
LACTATE SERPL-SCNC: 1.3 MMOL/L — SIGNIFICANT CHANGE UP (ref 0.7–2)
LYMPHOCYTES # BLD AUTO: 1.03 K/UL — SIGNIFICANT CHANGE UP (ref 1–3.3)
LYMPHOCYTES # BLD AUTO: 10.1 % — LOW (ref 13–44)
MAGNESIUM SERPL-MCNC: 2 MG/DL — SIGNIFICANT CHANGE UP (ref 1.6–2.6)
MCHC RBC-ENTMCNC: 32.9 PG — SIGNIFICANT CHANGE UP (ref 27–34)
MCHC RBC-ENTMCNC: 33.5 GM/DL — SIGNIFICANT CHANGE UP (ref 32–36)
MCV RBC AUTO: 98.2 FL — SIGNIFICANT CHANGE UP (ref 80–100)
MONOCYTES # BLD AUTO: 0.65 K/UL — SIGNIFICANT CHANGE UP (ref 0–0.9)
MONOCYTES NFR BLD AUTO: 6.3 % — SIGNIFICANT CHANGE UP (ref 2–14)
NEUTROPHILS # BLD AUTO: 8.51 K/UL — HIGH (ref 1.8–7.4)
NEUTROPHILS NFR BLD AUTO: 83.1 % — HIGH (ref 43–77)
PHOSPHATE SERPL-MCNC: 2.5 MG/DL — SIGNIFICANT CHANGE UP (ref 2.5–4.5)
PLATELET # BLD AUTO: 157 K/UL — SIGNIFICANT CHANGE UP (ref 150–400)
POTASSIUM SERPL-MCNC: 3.4 MMOL/L — LOW (ref 3.5–5.3)
POTASSIUM SERPL-SCNC: 3.4 MMOL/L — LOW (ref 3.5–5.3)
PROT SERPL-MCNC: 6.5 GM/DL — SIGNIFICANT CHANGE UP (ref 6–8.3)
RBC # BLD: 3.95 M/UL — SIGNIFICANT CHANGE UP (ref 3.8–5.2)
RBC # FLD: 13.6 % — SIGNIFICANT CHANGE UP (ref 10.3–14.5)
SODIUM SERPL-SCNC: 146 MMOL/L — HIGH (ref 135–145)
WBC # BLD: 10.27 K/UL — SIGNIFICANT CHANGE UP (ref 3.8–10.5)
WBC # FLD AUTO: 10.27 K/UL — SIGNIFICANT CHANGE UP (ref 3.8–10.5)

## 2018-02-15 PROCEDURE — 49000 EXPLORATION OF ABDOMEN: CPT | Mod: AS

## 2018-02-15 RX ORDER — DEXTROSE MONOHYDRATE, SODIUM CHLORIDE, AND POTASSIUM CHLORIDE 50; .745; 4.5 G/1000ML; G/1000ML; G/1000ML
1000 INJECTION, SOLUTION INTRAVENOUS
Qty: 0 | Refills: 0 | Status: DISCONTINUED | OUTPATIENT
Start: 2018-02-15 | End: 2018-02-19

## 2018-02-15 RX ADMIN — MORPHINE SULFATE 2 MILLIGRAM(S): 50 CAPSULE, EXTENDED RELEASE ORAL at 20:19

## 2018-02-15 RX ADMIN — Medication 100 GRAM(S): at 20:20

## 2018-02-15 RX ADMIN — Medication 1.25 MILLIGRAM(S): at 12:40

## 2018-02-15 RX ADMIN — DEXTROSE MONOHYDRATE, SODIUM CHLORIDE, AND POTASSIUM CHLORIDE 100 MILLILITER(S): 50; .745; 4.5 INJECTION, SOLUTION INTRAVENOUS at 11:02

## 2018-02-15 RX ADMIN — MORPHINE SULFATE 2 MILLIGRAM(S): 50 CAPSULE, EXTENDED RELEASE ORAL at 14:06

## 2018-02-15 RX ADMIN — Medication 1.25 MILLIGRAM(S): at 05:49

## 2018-02-15 RX ADMIN — Medication 100 GRAM(S): at 08:35

## 2018-02-15 RX ADMIN — Medication 1.25 MILLIGRAM(S): at 18:13

## 2018-02-15 RX ADMIN — MORPHINE SULFATE 2 MILLIGRAM(S): 50 CAPSULE, EXTENDED RELEASE ORAL at 20:34

## 2018-02-15 RX ADMIN — Medication 1.25 MILLIGRAM(S): at 00:04

## 2018-02-15 RX ADMIN — HEPARIN SODIUM 5000 UNIT(S): 5000 INJECTION INTRAVENOUS; SUBCUTANEOUS at 05:49

## 2018-02-15 RX ADMIN — HEPARIN SODIUM 5000 UNIT(S): 5000 INJECTION INTRAVENOUS; SUBCUTANEOUS at 18:13

## 2018-02-15 RX ADMIN — MORPHINE SULFATE 2 MILLIGRAM(S): 50 CAPSULE, EXTENDED RELEASE ORAL at 13:49

## 2018-02-15 NOTE — PROGRESS NOTE ADULT - SUBJECTIVE AND OBJECTIVE BOX
Patient was seen and examined at bedside. AO x 2.  Answers questions appropriately.  Patient feels better.  Postop pain continues to improve.  No complaint of nausea/vomiting.  Pt has not passed flatus or have bowel movement. Haro intact.  ??? looks well diluted on the bag.  Lactate trend Nl.        MEDICATIONS  (STANDING):  cefoTEtan  IVPB 2 Gram(s) IV Intermittent every 12 hours  dextrose 5% + sodium chloride 0.45% with potassium chloride 20 mEq/L 1000 milliLiter(s) (100 mL/Hr) IV Continuous <Continuous>  enalaprilat Injectable 1.25 milliGRAM(s) IV Push every 6 hours  heparin  Injectable 5000 Unit(s) SubCutaneous every 12 hours  influenza   Vaccine 0.5 milliLiter(s) IntraMuscular once    MEDICATIONS  (PRN):  morphine  - Injectable 2 milliGRAM(s) IV Push every 4 hours PRN Moderate Pain (4 - 6)      Vital Signs Last 24 Hrs  T(C): 37.5 (15 Feb 2018 05:39), Max: 37.7 (2018 17:00)  T(F): 99.5 (15 Feb 2018 05:39), Max: 99.8 (2018 17:00)  HR: 78 (15 Feb 2018 05:39) (74 - 95)  BP: 151/78 (15 Feb 2018 05:39) (132/82 - 151/78)  BP(mean): --  RR: 16 (15 Feb 2018 05:39) (15 - 17)  SpO2: 92% (15 Feb 2018 05:39) (92% - 100%)    I&O's Detail    2018 07:01  -  15 Feb 2018 07:00  --------------------------------------------------------  IN:    IV PiggyBack: 50 mL    sodium chloride 0.9%: 2000 mL    Sodium Chloride 0.9% IV Bolus: 1000 mL  Total IN: 3050 mL    OUT:    Indwelling Catheter - Urethral: 600 mL    Nasoenteral Tube: 50 mL  Total OUT: 650 mL    Total NET: 2400 mL      15 Feb 2018 07:01  -  15 Feb 2018 08:46  --------------------------------------------------------  IN:  Total IN: 0 mL    OUT:    Indwelling Catheter - Urethral: 150 mL  Total OUT: 150 mL    Total NET: -150 mL          Physical Exam:      General: Pt is alert and orient person and place. NAD.  HEENT: NC/AT, EOMI, clear conjunctiva.  Cardiac: s1/ s2 Regular rhythm and rate  Pulmonary: normal respiratory efforts. Rhonchi noted  Abdomen:   Steri strip c/d/intact,   softly distended, hypoactive BS, +mild tenderness on postop area, no guarding  Extremities: no edema    LABS:                        13.0   10.27 )-----------( 157      ( 15 Feb 2018 06:45 )             38.8     02-15    146<H>  |  109<H>  |  13  ----------------------------<  107<H>  3.4<L>   |  29  |  1.03    Ca    8.2<L>      15 Feb 2018 06:45  Phos  2.5     02-15  Mg     2.0     02-15    TPro  6.5  /  Alb  2.4<L>  /  TBili  0.9  /  DBili  x   /  AST  19  /  ALT  15  /  AlkPhos  74  02-15    PT/INR - ( 2018 15:21 )   PT: 11.6 sec;   INR: 1.06 ratio         PTT - ( 2018 15:21 )  PTT:25.3 sec  Urinalysis Basic - ( 2018 17:18 )    Color: Yellow / Appearance: Clear / S.020 / pH: x  Gluc: x / Ketone: Negative  / Bili: Negative / Urobili: Negative mg/dL   Blood: x / Protein: 30 mg/dL / Nitrite: Negative   Leuk Esterase: Trace / RBC: 0-2 /HPF / WBC 6-10   Sq Epi: x / Non Sq Epi: Many / Bacteria: Moderate            Impression: 76 YO female with Demential, Hypertension, Hyperlipidemia a/w Closed loop SBO  s/p exploratory laparotomy and lysis of adhesion POD#2  hypernatremic and hypokalemic     Plan:  npo for now -- await better bowel function   monitor UO -- Discuss to RN accurate recording -- will keep haro for now till better outpu   IVF changed to 1/2 NS with NIRAJ  follow up qamar   pain management   cont medical management/supportive care   prophylactic measure:   Incentive spirometer instructions given, venodynes, DVt prophylaxis, OOB ambulation   will discuss plan with  Dr. Abiodun Avina

## 2018-02-16 PROBLEM — Z00.00 ENCOUNTER FOR PREVENTIVE HEALTH EXAMINATION: Status: ACTIVE | Noted: 2018-02-16

## 2018-02-16 LAB
ANION GAP SERPL CALC-SCNC: 7 MMOL/L — SIGNIFICANT CHANGE UP (ref 5–17)
BUN SERPL-MCNC: 10 MG/DL — SIGNIFICANT CHANGE UP (ref 7–23)
CALCIUM SERPL-MCNC: 7.9 MG/DL — LOW (ref 8.5–10.1)
CHLORIDE SERPL-SCNC: 110 MMOL/L — HIGH (ref 96–108)
CO2 SERPL-SCNC: 28 MMOL/L — SIGNIFICANT CHANGE UP (ref 22–31)
CREAT SERPL-MCNC: 0.94 MG/DL — SIGNIFICANT CHANGE UP (ref 0.5–1.3)
GLUCOSE SERPL-MCNC: 132 MG/DL — HIGH (ref 70–99)
HCT VFR BLD CALC: 36.8 % — SIGNIFICANT CHANGE UP (ref 34.5–45)
HGB BLD-MCNC: 12.3 G/DL — SIGNIFICANT CHANGE UP (ref 11.5–15.5)
MAGNESIUM SERPL-MCNC: 2.1 MG/DL — SIGNIFICANT CHANGE UP (ref 1.6–2.6)
MCHC RBC-ENTMCNC: 31.9 PG — SIGNIFICANT CHANGE UP (ref 27–34)
MCHC RBC-ENTMCNC: 33.4 GM/DL — SIGNIFICANT CHANGE UP (ref 32–36)
MCV RBC AUTO: 95.6 FL — SIGNIFICANT CHANGE UP (ref 80–100)
NRBC # BLD: 0 /100 WBCS — SIGNIFICANT CHANGE UP (ref 0–0)
PHOSPHATE SERPL-MCNC: 1.7 MG/DL — LOW (ref 2.5–4.5)
PLATELET # BLD AUTO: 157 K/UL — SIGNIFICANT CHANGE UP (ref 150–400)
POTASSIUM SERPL-MCNC: 3.4 MMOL/L — LOW (ref 3.5–5.3)
POTASSIUM SERPL-SCNC: 3.4 MMOL/L — LOW (ref 3.5–5.3)
RBC # BLD: 3.85 M/UL — SIGNIFICANT CHANGE UP (ref 3.8–5.2)
RBC # FLD: 13.5 % — SIGNIFICANT CHANGE UP (ref 10.3–14.5)
SODIUM SERPL-SCNC: 145 MMOL/L — SIGNIFICANT CHANGE UP (ref 135–145)
WBC # BLD: 8.97 K/UL — SIGNIFICANT CHANGE UP (ref 3.8–10.5)
WBC # FLD AUTO: 8.97 K/UL — SIGNIFICANT CHANGE UP (ref 3.8–10.5)

## 2018-02-16 RX ORDER — POTASSIUM PHOSPHATE, MONOBASIC POTASSIUM PHOSPHATE, DIBASIC 236; 224 MG/ML; MG/ML
15 INJECTION, SOLUTION INTRAVENOUS ONCE
Qty: 0 | Refills: 0 | Status: COMPLETED | OUTPATIENT
Start: 2018-02-16 | End: 2018-02-16

## 2018-02-16 RX ADMIN — Medication 1.25 MILLIGRAM(S): at 00:14

## 2018-02-16 RX ADMIN — MORPHINE SULFATE 2 MILLIGRAM(S): 50 CAPSULE, EXTENDED RELEASE ORAL at 21:26

## 2018-02-16 RX ADMIN — Medication 1.25 MILLIGRAM(S): at 06:06

## 2018-02-16 RX ADMIN — MORPHINE SULFATE 2 MILLIGRAM(S): 50 CAPSULE, EXTENDED RELEASE ORAL at 21:11

## 2018-02-16 RX ADMIN — HEPARIN SODIUM 5000 UNIT(S): 5000 INJECTION INTRAVENOUS; SUBCUTANEOUS at 18:18

## 2018-02-16 RX ADMIN — DEXTROSE MONOHYDRATE, SODIUM CHLORIDE, AND POTASSIUM CHLORIDE 100 MILLILITER(S): 50; .745; 4.5 INJECTION, SOLUTION INTRAVENOUS at 00:14

## 2018-02-16 RX ADMIN — HEPARIN SODIUM 5000 UNIT(S): 5000 INJECTION INTRAVENOUS; SUBCUTANEOUS at 06:06

## 2018-02-16 RX ADMIN — MORPHINE SULFATE 2 MILLIGRAM(S): 50 CAPSULE, EXTENDED RELEASE ORAL at 08:49

## 2018-02-16 RX ADMIN — POTASSIUM PHOSPHATE, MONOBASIC POTASSIUM PHOSPHATE, DIBASIC 63.75 MILLIMOLE(S): 236; 224 INJECTION, SOLUTION INTRAVENOUS at 10:39

## 2018-02-16 RX ADMIN — DEXTROSE MONOHYDRATE, SODIUM CHLORIDE, AND POTASSIUM CHLORIDE 100 MILLILITER(S): 50; .745; 4.5 INJECTION, SOLUTION INTRAVENOUS at 18:18

## 2018-02-16 RX ADMIN — Medication 100 GRAM(S): at 08:49

## 2018-02-16 RX ADMIN — Medication 1.25 MILLIGRAM(S): at 18:18

## 2018-02-16 RX ADMIN — DEXTROSE MONOHYDRATE, SODIUM CHLORIDE, AND POTASSIUM CHLORIDE 100 MILLILITER(S): 50; .745; 4.5 INJECTION, SOLUTION INTRAVENOUS at 15:48

## 2018-02-16 RX ADMIN — DEXTROSE MONOHYDRATE, SODIUM CHLORIDE, AND POTASSIUM CHLORIDE 100 MILLILITER(S): 50; .745; 4.5 INJECTION, SOLUTION INTRAVENOUS at 06:06

## 2018-02-16 RX ADMIN — Medication 1.25 MILLIGRAM(S): at 12:51

## 2018-02-16 RX ADMIN — Medication 100 GRAM(S): at 21:40

## 2018-02-16 NOTE — CHART NOTE - NSCHARTNOTEFT_GEN_A_CORE
Called by RN that the patient has not voided since the haro was taken out at 3pm. Patient seen at bedside. Bladder scan is 575. Patient c/o mild pain and discomfort. 16 Fr haro was placed and patient tolerated well. Clear yellow urine flowing immediately after haro insertion. 700ml output so far. Patient tolerated well and is now more comfortable.

## 2018-02-16 NOTE — PROGRESS NOTE ADULT - SUBJECTIVE AND OBJECTIVE BOX
SURGERY PROGRESS HPI:  Pt seen and examined at bedside. Denies pain and complaints. Pt denies nausea and vomiting. No BM/flatus. Pt denies chest pain, SOB, dizziness, fever, chills.       Vital Signs Last 24 Hrs  T(C): 36.5 (16 Feb 2018 06:00), Max: 37.9 (15 Feb 2018 16:45)  T(F): 97.7 (16 Feb 2018 06:00), Max: 100.3 (15 Feb 2018 16:45)  HR: 94 (16 Feb 2018 06:00) (69 - 94)  BP: 157/89 (16 Feb 2018 06:00) (141/88 - 165/83)  BP(mean): --  RR: 16 (16 Feb 2018 06:00) (16 - 18)  SpO2: 93% (16 Feb 2018 06:00) (93% - 97%)      PHYSICAL EXAM:    GENERAL: NAD  CHEST/LUNG: Clear to ausculation, bilaterally   HEART: RRR S1S2  ABDOMEN: Steri strips clean/dry/intact. non distended, hypoactive BS, soft, mild appropriate incisional tenderness  : haro with clear yellow urine   EXTREMITIES:  calf soft, non tender b/l    I&O's Detail    14 Feb 2018 07:01  -  15 Feb 2018 07:00  --------------------------------------------------------  IN:    IV PiggyBack: 50 mL    sodium chloride 0.9%: 2000 mL    Sodium Chloride 0.9% IV Bolus: 1000 mL  Total IN: 3050 mL    OUT:    Indwelling Catheter - Urethral: 600 mL    Nasoenteral Tube: 50 mL  Total OUT: 650 mL    Total NET: 2400 mL      15 Feb 2018 07:01  -  16 Feb 2018 06:25  --------------------------------------------------------  IN:  Total IN: 0 mL    OUT:    Indwelling Catheter - Urethral: 1050 mL  Total OUT: 1050 mL    Total NET: -1050 mL          LABS:                        13.0   10.27 )-----------( 157      ( 15 Feb 2018 06:45 )             38.8     02-15    146<H>  |  109<H>  |  13  ----------------------------<  107<H>  3.4<L>   |  29  |  1.03    Ca    8.2<L>      15 Feb 2018 06:45  Phos  2.5     02-15  Mg     2.0     02-15    TPro  6.5  /  Alb  2.4<L>  /  TBili  0.9  /  DBili  x   /  AST  19  /  ALT  15  /  AlkPhos  74  02-15    Culture Results:   <10,000 CFU/ml  Normal Urogenital jose present (02-14 @ 00:25)      Impression: 76 YO female with Demential, Hypertension, Hyperlipidemia a/w Closed loop SBO  s/p exploratory laparotomy and lysis of adhesion POD#3. Patient was not able to void overnight, bladder scan 575, and haro reinserted    Plan:  -npo for now, await better bowel function   -continue haro for now. monitor urine output  -IVF  -pain management   -cont medical management/supportive care   -Incentive spirometer instructions given, venodynes, DVt prophylaxis, OOB ambulation   -will discuss plan with Dr. Rascon

## 2018-02-17 LAB
ANION GAP SERPL CALC-SCNC: 11 MMOL/L — SIGNIFICANT CHANGE UP (ref 5–17)
BUN SERPL-MCNC: 9 MG/DL — SIGNIFICANT CHANGE UP (ref 7–23)
CALCIUM SERPL-MCNC: 8.6 MG/DL — SIGNIFICANT CHANGE UP (ref 8.5–10.1)
CHLORIDE SERPL-SCNC: 107 MMOL/L — SIGNIFICANT CHANGE UP (ref 96–108)
CO2 SERPL-SCNC: 25 MMOL/L — SIGNIFICANT CHANGE UP (ref 22–31)
CREAT SERPL-MCNC: 0.83 MG/DL — SIGNIFICANT CHANGE UP (ref 0.5–1.3)
GLUCOSE SERPL-MCNC: 97 MG/DL — SIGNIFICANT CHANGE UP (ref 70–99)
HCT VFR BLD CALC: 37.7 % — SIGNIFICANT CHANGE UP (ref 34.5–45)
HGB BLD-MCNC: 12.7 G/DL — SIGNIFICANT CHANGE UP (ref 11.5–15.5)
MAGNESIUM SERPL-MCNC: 2.1 MG/DL — SIGNIFICANT CHANGE UP (ref 1.6–2.6)
MCHC RBC-ENTMCNC: 32 PG — SIGNIFICANT CHANGE UP (ref 27–34)
MCHC RBC-ENTMCNC: 33.7 GM/DL — SIGNIFICANT CHANGE UP (ref 32–36)
MCV RBC AUTO: 95 FL — SIGNIFICANT CHANGE UP (ref 80–100)
NRBC # BLD: 0 /100 WBCS — SIGNIFICANT CHANGE UP (ref 0–0)
PHOSPHATE SERPL-MCNC: 1.6 MG/DL — LOW (ref 2.5–4.5)
PLATELET # BLD AUTO: 173 K/UL — SIGNIFICANT CHANGE UP (ref 150–400)
POTASSIUM SERPL-MCNC: 3.4 MMOL/L — LOW (ref 3.5–5.3)
POTASSIUM SERPL-SCNC: 3.4 MMOL/L — LOW (ref 3.5–5.3)
RBC # BLD: 3.97 M/UL — SIGNIFICANT CHANGE UP (ref 3.8–5.2)
RBC # FLD: 13.3 % — SIGNIFICANT CHANGE UP (ref 10.3–14.5)
SODIUM SERPL-SCNC: 143 MMOL/L — SIGNIFICANT CHANGE UP (ref 135–145)
WBC # BLD: 9.59 K/UL — SIGNIFICANT CHANGE UP (ref 3.8–10.5)
WBC # FLD AUTO: 9.59 K/UL — SIGNIFICANT CHANGE UP (ref 3.8–10.5)

## 2018-02-17 RX ORDER — POTASSIUM PHOSPHATE, MONOBASIC POTASSIUM PHOSPHATE, DIBASIC 236; 224 MG/ML; MG/ML
15 INJECTION, SOLUTION INTRAVENOUS ONCE
Qty: 0 | Refills: 0 | Status: COMPLETED | OUTPATIENT
Start: 2018-02-17 | End: 2018-02-17

## 2018-02-17 RX ORDER — DONEPEZIL HYDROCHLORIDE 10 MG/1
5 TABLET, FILM COATED ORAL AT BEDTIME
Qty: 0 | Refills: 0 | Status: DISCONTINUED | OUTPATIENT
Start: 2018-02-17 | End: 2018-02-19

## 2018-02-17 RX ORDER — ACETAMINOPHEN 500 MG
1000 TABLET ORAL EVERY 8 HOURS
Qty: 0 | Refills: 0 | Status: DISCONTINUED | OUTPATIENT
Start: 2018-02-17 | End: 2018-02-19

## 2018-02-17 RX ORDER — OLANZAPINE 15 MG/1
2.5 TABLET, FILM COATED ORAL ONCE
Qty: 0 | Refills: 0 | Status: COMPLETED | OUTPATIENT
Start: 2018-02-17 | End: 2018-02-17

## 2018-02-17 RX ADMIN — MORPHINE SULFATE 2 MILLIGRAM(S): 50 CAPSULE, EXTENDED RELEASE ORAL at 03:53

## 2018-02-17 RX ADMIN — Medication 1.25 MILLIGRAM(S): at 00:16

## 2018-02-17 RX ADMIN — Medication 1.25 MILLIGRAM(S): at 23:09

## 2018-02-17 RX ADMIN — DONEPEZIL HYDROCHLORIDE 5 MILLIGRAM(S): 10 TABLET, FILM COATED ORAL at 20:55

## 2018-02-17 RX ADMIN — Medication 1.25 MILLIGRAM(S): at 12:20

## 2018-02-17 RX ADMIN — HEPARIN SODIUM 5000 UNIT(S): 5000 INJECTION INTRAVENOUS; SUBCUTANEOUS at 05:59

## 2018-02-17 RX ADMIN — POTASSIUM PHOSPHATE, MONOBASIC POTASSIUM PHOSPHATE, DIBASIC 63.75 MILLIMOLE(S): 236; 224 INJECTION, SOLUTION INTRAVENOUS at 09:00

## 2018-02-17 RX ADMIN — OLANZAPINE 2.5 MILLIGRAM(S): 15 TABLET, FILM COATED ORAL at 16:08

## 2018-02-17 RX ADMIN — Medication 100 GRAM(S): at 09:00

## 2018-02-17 RX ADMIN — DEXTROSE MONOHYDRATE, SODIUM CHLORIDE, AND POTASSIUM CHLORIDE 100 MILLILITER(S): 50; .745; 4.5 INJECTION, SOLUTION INTRAVENOUS at 20:55

## 2018-02-17 RX ADMIN — HEPARIN SODIUM 5000 UNIT(S): 5000 INJECTION INTRAVENOUS; SUBCUTANEOUS at 18:28

## 2018-02-17 RX ADMIN — Medication 1.25 MILLIGRAM(S): at 05:59

## 2018-02-17 RX ADMIN — DEXTROSE MONOHYDRATE, SODIUM CHLORIDE, AND POTASSIUM CHLORIDE 100 MILLILITER(S): 50; .745; 4.5 INJECTION, SOLUTION INTRAVENOUS at 12:22

## 2018-02-17 RX ADMIN — Medication 100 GRAM(S): at 20:51

## 2018-02-17 RX ADMIN — Medication 1.25 MILLIGRAM(S): at 18:28

## 2018-02-17 RX ADMIN — MORPHINE SULFATE 2 MILLIGRAM(S): 50 CAPSULE, EXTENDED RELEASE ORAL at 03:38

## 2018-02-17 NOTE — DIETITIAN INITIAL EVALUATION ADULT. - PERTINENT LABORATORY DATA
02-17 Na143 mmol/L Glu 97 mg/dL K+ 3.4 mmol/L<L> Cr  0.83 mg/dL BUN 9 mg/dL Phos 1.6 mg/dL<L> Alb n/a   PAB n/a

## 2018-02-17 NOTE — PROGRESS NOTE ADULT - SUBJECTIVE AND OBJECTIVE BOX
SURGERY PROGRESS HPI:  Pt seen and examined at bedside. Denies pain and complaints. Pt denies nausea and vomiting. No BM/flatus. Pt denies chest pain, SOB, dizziness, fever, chills.       Vital Signs Last 24 Hrs  T(C): 37.1 (17 Feb 2018 05:56), Max: 37.6 (16 Feb 2018 17:33)  T(F): 98.8 (17 Feb 2018 05:56), Max: 99.7 (16 Feb 2018 17:33)  HR: 89 (17 Feb 2018 05:56) (72 - 89)  BP: 145/72 (17 Feb 2018 05:56) (135/78 - 164/90)  BP(mean): --  RR: 16 (17 Feb 2018 05:56) (16 - 18)  SpO2: 94% (17 Feb 2018 05:56) (92% - 98%)      PHYSICAL EXAM:    GENERAL: NAD  CHEST/LUNG: Clear to ausculation, bilaterally   HEART: RRR S1S2  ABDOMEN: Steri strips clean/dry/intact. non distended, hypoactive BS, soft, nontender  EXTREMITIES:  calf soft, non tender b/l      I&O's Detail    15 Feb 2018 07:01  -  16 Feb 2018 07:00  --------------------------------------------------------  IN:    dextrose 5% + sodium chloride 0.45% with potassium chloride 20 mEq/L: 1200 mL  Total IN: 1200 mL    OUT:    Indwelling Catheter - Urethral: 950 mL  Total OUT: 950 mL    Total NET: 250 mL      16 Feb 2018 07:01  -  17 Feb 2018 06:24  --------------------------------------------------------  IN:  Total IN: 0 mL    OUT:    Indwelling Catheter - Urethral: 900 mL  Total OUT: 900 mL    Total NET: -900 mL          LABS:                        12.3   8.97  )-----------( 157      ( 16 Feb 2018 06:18 )             36.8     02-16    145  |  110<H>  |  10  ----------------------------<  132<H>  3.4<L>   |  28  |  0.94    Ca    7.9<L>      16 Feb 2018 06:18  Phos  1.7     02-16  Mg     2.1     02-16    TPro  6.5  /  Alb  2.4<L>  /  TBili  0.9  /  DBili  x   /  AST  19  /  ALT  15  /  AlkPhos  74  02-15          Impression: 78 YO female with Demential, Hypertension, Hyperlipidemia a/w Closed loop SBO  s/p exploratory laparotomy and lysis of adhesion POD#4. Moran removed 15 minutes ago.    Plan:  -npo for now, await better bowel function   -IVF  -TOV  -pain management   -cont medical management/supportive care   -Incentive spirometer instructions given, venodynes, DVt prophylaxis, OOB ambulation   -Discussed with patient's daughter last night that patient must ambulate more today. Daughter will come in today and ambulate with her as well.  -will discuss plan with Dr. Rascon

## 2018-02-17 NOTE — DIETITIAN INITIAL EVALUATION ADULT. - OTHER INFO
Pt seen today due to NPO x 4 days. Pt lives at home c her daughter whom does the food shopping/cooking. She also has an [facility]HA 4hrs/ week. Denies food allergies. Last BM unknown. (-)N,V. positive some flatus. Awaiting bowel function.

## 2018-02-17 NOTE — CHART NOTE - NSCHARTNOTEFT_GEN_A_CORE
Called by RN to evaluate patient on 1:1 attempting to climb out of her bed. Patient seen at bedside struggling to climb over railing's. Zyprexa 2.5 IM x1 ordered. Will reassess. Continue 1:1. Home med of Aricept resumed at low dose. Will call pharmacy after 9 to verify doses of home medications and adjust accordingly.     Labs reviewed:                          12.7   9.59  )-----------( 173      ( 17 Feb 2018 08:04 )             37.7   02-17    143  |  107  |  9   ----------------------------<  97  3.4<L>   |  25  |  0.83    Ca    8.6      17 Feb 2018 08:04  Phos  1.6     02-17  Mg     2.1     02-17    Kphos replaced.

## 2018-02-18 LAB
ANION GAP SERPL CALC-SCNC: 7 MMOL/L — SIGNIFICANT CHANGE UP (ref 5–17)
BUN SERPL-MCNC: 8 MG/DL — SIGNIFICANT CHANGE UP (ref 7–23)
CALCIUM SERPL-MCNC: 8.5 MG/DL — SIGNIFICANT CHANGE UP (ref 8.5–10.1)
CHLORIDE SERPL-SCNC: 107 MMOL/L — SIGNIFICANT CHANGE UP (ref 96–108)
CO2 SERPL-SCNC: 29 MMOL/L — SIGNIFICANT CHANGE UP (ref 22–31)
CREAT SERPL-MCNC: 0.91 MG/DL — SIGNIFICANT CHANGE UP (ref 0.5–1.3)
GLUCOSE SERPL-MCNC: 104 MG/DL — HIGH (ref 70–99)
HCT VFR BLD CALC: 37.7 % — SIGNIFICANT CHANGE UP (ref 34.5–45)
HGB BLD-MCNC: 12.6 G/DL — SIGNIFICANT CHANGE UP (ref 11.5–15.5)
MAGNESIUM SERPL-MCNC: 2 MG/DL — SIGNIFICANT CHANGE UP (ref 1.6–2.6)
MCHC RBC-ENTMCNC: 31.4 PG — SIGNIFICANT CHANGE UP (ref 27–34)
MCHC RBC-ENTMCNC: 33.4 GM/DL — SIGNIFICANT CHANGE UP (ref 32–36)
MCV RBC AUTO: 94 FL — SIGNIFICANT CHANGE UP (ref 80–100)
NRBC # BLD: 0 /100 WBCS — SIGNIFICANT CHANGE UP (ref 0–0)
PHOSPHATE SERPL-MCNC: 2.8 MG/DL — SIGNIFICANT CHANGE UP (ref 2.5–4.5)
PLATELET # BLD AUTO: 188 K/UL — SIGNIFICANT CHANGE UP (ref 150–400)
POTASSIUM SERPL-MCNC: 3.4 MMOL/L — LOW (ref 3.5–5.3)
POTASSIUM SERPL-SCNC: 3.4 MMOL/L — LOW (ref 3.5–5.3)
RBC # BLD: 4.01 M/UL — SIGNIFICANT CHANGE UP (ref 3.8–5.2)
RBC # FLD: 13.2 % — SIGNIFICANT CHANGE UP (ref 10.3–14.5)
SODIUM SERPL-SCNC: 143 MMOL/L — SIGNIFICANT CHANGE UP (ref 135–145)
WBC # BLD: 6.34 K/UL — SIGNIFICANT CHANGE UP (ref 3.8–10.5)
WBC # FLD AUTO: 6.34 K/UL — SIGNIFICANT CHANGE UP (ref 3.8–10.5)

## 2018-02-18 RX ORDER — POTASSIUM CHLORIDE 20 MEQ
10 PACKET (EA) ORAL
Qty: 0 | Refills: 0 | Status: COMPLETED | OUTPATIENT
Start: 2018-02-18 | End: 2018-02-18

## 2018-02-18 RX ADMIN — Medication 1.25 MILLIGRAM(S): at 11:37

## 2018-02-18 RX ADMIN — Medication 100 MILLIEQUIVALENT(S): at 11:36

## 2018-02-18 RX ADMIN — Medication 1.25 MILLIGRAM(S): at 17:24

## 2018-02-18 RX ADMIN — HEPARIN SODIUM 5000 UNIT(S): 5000 INJECTION INTRAVENOUS; SUBCUTANEOUS at 17:24

## 2018-02-18 RX ADMIN — DEXTROSE MONOHYDRATE, SODIUM CHLORIDE, AND POTASSIUM CHLORIDE 100 MILLILITER(S): 50; .745; 4.5 INJECTION, SOLUTION INTRAVENOUS at 05:41

## 2018-02-18 RX ADMIN — Medication 1.25 MILLIGRAM(S): at 05:36

## 2018-02-18 RX ADMIN — DONEPEZIL HYDROCHLORIDE 5 MILLIGRAM(S): 10 TABLET, FILM COATED ORAL at 21:52

## 2018-02-18 RX ADMIN — Medication 100 MILLIEQUIVALENT(S): at 10:51

## 2018-02-18 RX ADMIN — DEXTROSE MONOHYDRATE, SODIUM CHLORIDE, AND POTASSIUM CHLORIDE 75 MILLILITER(S): 50; .745; 4.5 INJECTION, SOLUTION INTRAVENOUS at 11:49

## 2018-02-18 RX ADMIN — HEPARIN SODIUM 5000 UNIT(S): 5000 INJECTION INTRAVENOUS; SUBCUTANEOUS at 05:36

## 2018-02-18 RX ADMIN — Medication 100 GRAM(S): at 10:51

## 2018-02-18 RX ADMIN — DEXTROSE MONOHYDRATE, SODIUM CHLORIDE, AND POTASSIUM CHLORIDE 75 MILLILITER(S): 50; .745; 4.5 INJECTION, SOLUTION INTRAVENOUS at 21:52

## 2018-02-18 NOTE — PROGRESS NOTE ADULT - SUBJECTIVE AND OBJECTIVE BOX
Patient seen and examined at bedside in no distress.  Demented.  + Flatus per RN.   + Void per RN.    Vital Signs Last 24 Hrs  T(F): 98.5 (02-18-18 @ 05:00), Max: 98.8 (02-17-18 @ 23:12)  HR: 66 (02-18-18 @ 05:00)  BP: 162/87 (02-18-18 @ 05:00)  RR: 17 (02-18-18 @ 05:00)  SpO2: 96% (02-18-18 @ 05:00)    GENERAL: Alert, awake, NAD  CHEST/LUNG: Clear to auscultation bilaterally, respirations nonlabored  HEART: S1S2, Regular rate and rhythm  ABDOMEN: Steri strips clean/dry/intact x1, + Bowel sounds, soft, nondistended, nontedner  EXTREMITIES: No calf tenderness, no pedal edema, b/l SCDs in place    LABS:                        12.6   6.34  )-----------( 188      ( 18 Feb 2018 06:00 )             37.7     02-18    143  |  107  |  8   ----------------------------<  104<H>  3.4<L>   |  29  |  0.91    Ca    8.5      18 Feb 2018 06:00  Phos  2.8     02-18  Mg     2.0     02-18      Impression: 77 year old female with PMHx of Demential, HTN, HLD a/w closed loop SBO s/p exploratory laparotomy and lysis of adhesion POD#5   Plan:   - Likely advance diet today as tolerated  - Continue local wound care  - Continue IV ABX  - Monitor labs  - Continue home meds  - DVT prophylaxis, Incentive Spirometer, OOB, Ambulating, pain control  - continue current management per medicine  - Will discuss with Dr. Abiodun Kasper PAs

## 2018-02-19 ENCOUNTER — TRANSCRIPTION ENCOUNTER (OUTPATIENT)
Age: 78
End: 2018-02-19

## 2018-02-19 VITALS
RESPIRATION RATE: 16 BRPM | HEART RATE: 94 BPM | DIASTOLIC BLOOD PRESSURE: 78 MMHG | OXYGEN SATURATION: 98 % | TEMPERATURE: 98 F | SYSTOLIC BLOOD PRESSURE: 141 MMHG

## 2018-02-19 LAB
ANION GAP SERPL CALC-SCNC: 9 MMOL/L — SIGNIFICANT CHANGE UP (ref 5–17)
BUN SERPL-MCNC: 5 MG/DL — LOW (ref 7–23)
CALCIUM SERPL-MCNC: 8.6 MG/DL — SIGNIFICANT CHANGE UP (ref 8.5–10.1)
CHLORIDE SERPL-SCNC: 105 MMOL/L — SIGNIFICANT CHANGE UP (ref 96–108)
CO2 SERPL-SCNC: 28 MMOL/L — SIGNIFICANT CHANGE UP (ref 22–31)
CREAT SERPL-MCNC: 0.77 MG/DL — SIGNIFICANT CHANGE UP (ref 0.5–1.3)
GLUCOSE SERPL-MCNC: 95 MG/DL — SIGNIFICANT CHANGE UP (ref 70–99)
HCT VFR BLD CALC: 38.4 % — SIGNIFICANT CHANGE UP (ref 34.5–45)
HGB BLD-MCNC: 12.9 G/DL — SIGNIFICANT CHANGE UP (ref 11.5–15.5)
MAGNESIUM SERPL-MCNC: 2 MG/DL — SIGNIFICANT CHANGE UP (ref 1.6–2.6)
MCHC RBC-ENTMCNC: 31.4 PG — SIGNIFICANT CHANGE UP (ref 27–34)
MCHC RBC-ENTMCNC: 33.6 GM/DL — SIGNIFICANT CHANGE UP (ref 32–36)
MCV RBC AUTO: 93.4 FL — SIGNIFICANT CHANGE UP (ref 80–100)
NRBC # BLD: 0 /100 WBCS — SIGNIFICANT CHANGE UP (ref 0–0)
PHOSPHATE SERPL-MCNC: 3 MG/DL — SIGNIFICANT CHANGE UP (ref 2.5–4.5)
PLATELET # BLD AUTO: 191 K/UL — SIGNIFICANT CHANGE UP (ref 150–400)
POTASSIUM SERPL-MCNC: 3.8 MMOL/L — SIGNIFICANT CHANGE UP (ref 3.5–5.3)
POTASSIUM SERPL-SCNC: 3.8 MMOL/L — SIGNIFICANT CHANGE UP (ref 3.5–5.3)
RBC # BLD: 4.11 M/UL — SIGNIFICANT CHANGE UP (ref 3.8–5.2)
RBC # FLD: 13.3 % — SIGNIFICANT CHANGE UP (ref 10.3–14.5)
SODIUM SERPL-SCNC: 142 MMOL/L — SIGNIFICANT CHANGE UP (ref 135–145)
WBC # BLD: 6.51 K/UL — SIGNIFICANT CHANGE UP (ref 3.8–10.5)
WBC # FLD AUTO: 6.51 K/UL — SIGNIFICANT CHANGE UP (ref 3.8–10.5)

## 2018-02-19 RX ORDER — DONEPEZIL HYDROCHLORIDE 10 MG/1
1 TABLET, FILM COATED ORAL
Qty: 0 | Refills: 0 | COMMUNITY
Start: 2018-02-19

## 2018-02-19 RX ORDER — DONEPEZIL HYDROCHLORIDE 10 MG/1
0 TABLET, FILM COATED ORAL
Qty: 0 | Refills: 0 | COMMUNITY

## 2018-02-19 RX ORDER — AMLODIPINE BESYLATE 2.5 MG/1
2.5 TABLET ORAL DAILY
Qty: 0 | Refills: 0 | Status: DISCONTINUED | OUTPATIENT
Start: 2018-02-19 | End: 2018-02-19

## 2018-02-19 RX ORDER — ACETAMINOPHEN 500 MG
650 TABLET ORAL EVERY 6 HOURS
Qty: 0 | Refills: 0 | Status: DISCONTINUED | OUTPATIENT
Start: 2018-02-19 | End: 2018-02-19

## 2018-02-19 RX ADMIN — Medication 1.25 MILLIGRAM(S): at 00:05

## 2018-02-19 RX ADMIN — HEPARIN SODIUM 5000 UNIT(S): 5000 INJECTION INTRAVENOUS; SUBCUTANEOUS at 05:51

## 2018-02-19 RX ADMIN — DEXTROSE MONOHYDRATE, SODIUM CHLORIDE, AND POTASSIUM CHLORIDE 75 MILLILITER(S): 50; .745; 4.5 INJECTION, SOLUTION INTRAVENOUS at 08:36

## 2018-02-19 RX ADMIN — Medication 1.25 MILLIGRAM(S): at 05:51

## 2018-02-19 NOTE — DISCHARGE NOTE ADULT - CARE PROVIDER_API CALL
Guilherme Rascon), Surgery  [address] [place] [address]  Phone: [phone]  Fax: [phone]

## 2018-02-19 NOTE — DISCHARGE NOTE ADULT - PLAN OF CARE
Pain control, post op care Please follow up with Dr. Rascon in 7-10 days. You may call his office to schedule an appointment.  Drink plenty of fluids to prevent constipation and fruit juices without pulp that are high in vitamin C. Rest as needed. Do not lift anything heavier than 10 pounds. You may take motrin or advil for mild pain as needed, in addition to prescribed narcotic medication. You may take over the counter stool softeners as needed. Call for any fever over 101, nausea, vomiting, severe pain, no passing of gas or bowel movement. You may shower and pat dry abdomen. Leave the white steri strips in place; they will fall off in 5-7 days. Please continue your home medication regimen and follow up with your primary care physician

## 2018-02-19 NOTE — PROGRESS NOTE ADULT - ATTENDING COMMENTS
Patient seen and examined.  Denies abdominal pain or nausea.  Denies passing flatus or having a bowel movement as of yet.    AVSS  NAD  Abdomen - soft, mildly distended, appropriate dawson-incisional tenderness, incision clean with Steri-strips.    Lactate now normal = 1.3    Assessment: 77-year-old female POD 2 s/p exploratory laparotomy, lysis of adhesions, and relief of closed loop small bowel resection.    Plan:  - Continue NPO/IVF until return of bowel function.  May need NG tube replaced if becomes nauseated.  - Discontinue Moran catheter.  - Continue IV antibiotics for enteritis.  - Needs to get OOB and ambulate with assistance.
Patient seen and examined.  Denies pain or nausea.  Denies passing flatus.    AVSS  NG output thin and clear  NAD  Abdomen - soft, mildly distended, appropriate dawson-incisional tenderness    Assessment: 77-year-old female POD 1 s/p exploratory laparotomy, lysis of adhesions, and relief of closed loop small bowel resection.    Plan:  - Continue NPO with NG tube until return of bowel function.  - Continue IV fluids and continue to trend lactate (now down to 2.7 from 3.8).  Repeat labs ordered for 6:00 pm.  - Continue Moran catheter for strict I/Os.  - Continue IV antibiotics for enteritis.  - OOB with assistance
Patient seen and examined.  States that she is feeling better and getting hungry.  Denies abdominal pain or nausea.  Denies passing flatus or having a bowel movement as of yet.    AVSS  NAD  Abdomen - soft, less distended, non-tender, incision clean with Steri-strips.    Assessment: 77-year-old female POD 4 s/p exploratory laparotomy, lysis of adhesions, and relief of closed loop small bowel resection.    Plan:  - Continue NPO/IVF until return of bowel function.  - Replete lytes.  - Trial of Void.  - Continue IV antibiotics for enteritis to complete a 5 day course.  - Needs to ambulate.
Patient seen and examined.  States that she is feeling well and that she is hungry.  Denies abdominal pain or nausea.  She denies passing flatus or having a bowel movement, however, the aid heard her passing flatus yesterday and she had 2 bowel movements this morning.    AVSS  NAD  Abdomen - soft, minimally distended, non-tender, incision clean with Steri-strips.    Assessment: 77-year-old female POD 5 s/p exploratory laparotomy, lysis of adhesions, and relief of closed loop small bowel resection.    Plan:  - Clear liquid diet.  - Discontinue antibiotics.  - Continue 1:1 and ambulate with assistance.
Patient seen and examined.  States that she is starting to feel better.  Denies abdominal pain or nausea.  Denies passing flatus or having a bowel movement as of yet.    AVSS  NAD  Abdomen - soft, mildly distended, appropriate dawson-incisional tenderness, incision clean with Steri-strips.    Assessment: 77-year-old female POD 3 s/p exploratory laparotomy, lysis of adhesions, and relief of closed loop small bowel resection.    Plan:  - Continue NPO/IVF until return of bowel function.  May need NG tube replaced if becomes nauseated.  - Plan for repeat trial of void in AM.  - Continue IV antibiotics for enteritis to complete a 5 day course.  - Needs to get OOB and ambulate with assistance.
Patient seen and examined.  States that she is feeling well and that she is hungry.  Denies abdominal pain or nausea.  As per the aid she drank a lot of clears.  No clear episodes of flatus or bowel movements since yesterday AM  AVSS  NAD  Abdomen - soft, non-distended, non-tender, incision clean with Steri-strips.    Assessment: 77-year-old female POD 6 s/p exploratory laparotomy, lysis of adhesions, and relief of closed loop small bowel resection.    Plan:  - Will advance to low residue diet.  - Discharge planning once she tolerates diet and has documented evidence of continued bowel function.

## 2018-02-19 NOTE — DISCHARGE NOTE ADULT - MEDICATION SUMMARY - MEDICATIONS TO TAKE
I will START or STAY ON the medications listed below when I get home from the hospital:    simvastatin  -- Indication: For HLD    amLODIPine  -- Indication: For HTN (hypertension)    donepezil 5 mg oral tablet  -- 1 tab(s) by mouth once a day (at bedtime)  -- Indication: For Dementia

## 2018-02-19 NOTE — PROGRESS NOTE ADULT - SUBJECTIVE AND OBJECTIVE BOX
Patient seen and examined at bedside in chair in no distress.  Eating breakfast; no abdominal pain/nausea/vomiting.  Per RN, patient had a normal BM and has been passing gas.  Denies fever, chills, chest pain, sob.    Vital Signs Last 24 Hrs  T(F): 97.4 (02-19-18 @ 05:19), Max: 98.5 (02-19-18 @ 00:05)  HR: 60 (02-19-18 @ 05:19)  BP: 151/79 (02-19-18 @ 05:19)  RR: 15 (02-19-18 @ 05:19)  SpO2: 97% (02-19-18 @ 05:19)    GENERAL: Alert, oriented, NAD  CHEST/LUNG: Clear to auscultation bilaterally, respirations nonlabored  HEART: S1S2, Regular rate and rhythm  ABDOMEN: Steri strips c/d/i over surgical site. + Bowel sounds, soft, nondistended, nontender.   EXTREMITIES: No calf tenderness, no pedal edema b/l     LABS:                        12.9   6.51  )-----------( 191      ( 19 Feb 2018 06:28 )             38.4     02-19    142  |  105  |  5<L>  ----------------------------<  95  3.8   |  28  |  0.77    Ca    8.6      19 Feb 2018 06:28  Phos  3.0     02-19  Mg     2.0     02-19    Impression: 77 year old female PMH dementia, HTN, HLD, POD#6 s/p ex lap, SEPIDEH 2/2 closed loop SBO  Plan:  - Advance diet as tolerated  - Discussed discharge plans with patient's daughter, Montse. Per Montse, patient has a home health aide for 4 hours a day. Montse requested extending those hours. Awaiting case management for arrangements  - DVT ppx, incentive spirometer, OOB to ambulate as tolerated with assistance, pain management PRN  - Will discuss with Dr. Rascon Patient seen and examined at bedside in chair in no distress.  Eating breakfast; no abdominal pain/nausea/vomiting.  Per RN, patient had a normal BM and has been passing gas.  Denies fever, chills, chest pain, sob.    Vital Signs Last 24 Hrs  T(F): 97.4 (02-19-18 @ 05:19), Max: 98.5 (02-19-18 @ 00:05)  HR: 60 (02-19-18 @ 05:19)  BP: 151/79 (02-19-18 @ 05:19)  RR: 15 (02-19-18 @ 05:19)  SpO2: 97% (02-19-18 @ 05:19)    GENERAL: Alert, oriented, NAD  CHEST/LUNG: Clear to auscultation bilaterally, respirations nonlabored  HEART: S1S2, Regular rate and rhythm  ABDOMEN: Steri strips c/d/i over surgical site. + Bowel sounds, soft, nondistended, nontender.   EXTREMITIES: No calf tenderness, no pedal edema b/l     LABS:                        12.9   6.51  )-----------( 191      ( 19 Feb 2018 06:28 )             38.4     02-19    142  |  105  |  5<L>  ----------------------------<  95  3.8   |  28  |  0.77    Ca    8.6      19 Feb 2018 06:28  Phos  3.0     02-19  Mg     2.0     02-19    Impression: 77 year old female PMH dementia, HTN, HLD, POD#6 s/p ex lap, SEPIDEH 2/2 closed loop SBO  Plan:  - Advance diet as tolerated  - Discussed discharge plans with patient's daughter, Leigh. Per Leigh, patient has a home health aide for 4 hours a day. Leighrequested extending those hours. Awaiting case management for arrangements  - DVT ppx, incentive spirometer, OOB to ambulate as tolerated with assistance, pain management PRN  - Will discuss with Dr. Rascon

## 2018-02-19 NOTE — DISCHARGE NOTE ADULT - PATIENT PORTAL LINK FT
You can access the [facility] Patient Portal, offered by [facility], by registering with the following website: [email or web address]

## 2018-02-19 NOTE — DISCHARGE NOTE ADULT - HOSPITAL COURSE
Patient is a 77F PMH dementia, HTN, HLD who presented to the ED with abdominal pain, was found to have closed loop SBO. She was taken to the OR for an ex lap and SEPIDEH. Post-operatively, her diet was advanced slowly. She was discharged home when she was hemodynamically stable, tolerating regular diet, with normal bowel function, and without pain.

## 2018-02-19 NOTE — DISCHARGE NOTE ADULT - CARE PLAN
Principal Discharge DX:	SBO (small bowel obstruction)  Goal:	Pain control, post op care  Assessment and plan of treatment:	Please follow up with Dr. Rascon in 7-10 days. You may call his office to schedule an appointment.  Drink plenty of fluids to prevent constipation and fruit juices without pulp that are high in vitamin C. Rest as needed. Do not lift anything heavier than 10 pounds. You may take motrin or advil for mild pain as needed, in addition to prescribed narcotic medication. You may take over the counter stool softeners as needed. Call for any fever over 101, nausea, vomiting, severe pain, no passing of gas or bowel movement. You may shower and pat dry abdomen. Leave the white steri strips in place; they will fall off in 5-7 days.  Secondary Diagnosis:	HTN (hypertension)  Assessment and plan of treatment:	Please continue your home medication regimen and follow up with your primary care physician  Secondary Diagnosis:	Hyperlipidemia  Assessment and plan of treatment:	Please continue your home medication regimen and follow up with your primary care physician  Secondary Diagnosis:	Dementia  Assessment and plan of treatment:	Please continue your home medication regimen and follow up with your primary care physician

## 2018-02-22 DIAGNOSIS — F03.90 UNSPECIFIED DEMENTIA WITHOUT BEHAVIORAL DISTURBANCE: ICD-10-CM

## 2018-02-22 DIAGNOSIS — I10 ESSENTIAL (PRIMARY) HYPERTENSION: ICD-10-CM

## 2018-02-22 DIAGNOSIS — K56.50 INTESTINAL ADHESIONS [BANDS], UNSPECIFIED AS TO PARTIAL VERSUS COMPLETE OBSTRUCTION: ICD-10-CM

## 2018-02-22 DIAGNOSIS — K56.2 VOLVULUS: ICD-10-CM

## 2018-02-22 DIAGNOSIS — E78.5 HYPERLIPIDEMIA, UNSPECIFIED: ICD-10-CM

## 2018-02-26 PROBLEM — I10 ESSENTIAL (PRIMARY) HYPERTENSION: Chronic | Status: ACTIVE | Noted: 2018-02-13

## 2018-02-26 PROBLEM — E78.5 HYPERLIPIDEMIA, UNSPECIFIED: Chronic | Status: ACTIVE | Noted: 2018-02-13

## 2018-03-02 ENCOUNTER — APPOINTMENT (OUTPATIENT)
Dept: SURGERY | Facility: CLINIC | Age: 78
End: 2018-03-02
Payer: MEDICARE

## 2018-03-02 VITALS
DIASTOLIC BLOOD PRESSURE: 78 MMHG | BODY MASS INDEX: 23.04 KG/M2 | WEIGHT: 130 LBS | HEART RATE: 101 BPM | HEIGHT: 63 IN | SYSTOLIC BLOOD PRESSURE: 124 MMHG

## 2018-03-02 DIAGNOSIS — Z87.19 PERSONAL HISTORY OF OTHER DISEASES OF THE DIGESTIVE SYSTEM: ICD-10-CM

## 2018-03-02 PROCEDURE — 99024 POSTOP FOLLOW-UP VISIT: CPT

## 2022-04-13 NOTE — BRIEF OPERATIVE NOTE - PRE-OP
<<-----Click on this checkbox to enter Pre-Op Dx Alert-The patient is alert, awake and responds to voice. The patient is oriented to time, place, and person. The triage nurse is able to obtain subjective information.

## 2022-11-06 NOTE — DISCHARGE NOTE ADULT - DISCHARGE TO
--Patient provided with discharge instructions and any prescriptions. --Instructions, dosing, and follow-up appointments reviewed with patient/family. No further questions or needs at this time. --Vital signs and patient stable upon discharge. --Patient ambulatory to [facility].         Halima Henry RN  11/05/22 2032 Home with Home Care

## 2023-01-24 NOTE — PATIENT PROFILE ADULT. - FUNCTIONAL SCREEN CURRENT LEVEL: SWALLOWING (IF SCORE 2 OR MORE FOR ANY ITEM, CONSULT REHAB SERVICES), MLM)
(0) swallows foods/liquids without difficulty Island Pedicle Flap With Canthal Suspension Text: The defect edges were debeveled with a #15 scalpel blade.  Given the location of the defect, shape of the defect and the proximity to free margins an island pedicle advancement flap was deemed most appropriate.  Using a sterile surgical marker, an appropriate advancement flap was drawn incorporating the defect, outlining the appropriate donor tissue and placing the expected incisions within the relaxed skin tension lines where possible. The area thus outlined was incised deep to adipose tissue with a #15 scalpel blade.  The skin margins were undermined to an appropriate distance in all directions around the primary defect and laterally outward around the island pedicle utilizing iris scissors.  There was minimal undermining beneath the pedicle flap. A suspension suture was placed in the canthal tendon to prevent tension and prevent ectropion.

## 2024-01-16 NOTE — DISCHARGE NOTE ADULT - CAREGIVER RELATION TO PATIENT
Follow up phone call made by CHF Clinical Nurse Navigator. Tracie roberts @ [facility]. Plan discharge home this week. Weights are being monitored. Currently has no HF flare up symptoms. I will continue to follow for CHF management.    daughter